# Patient Record
Sex: MALE | Race: OTHER | HISPANIC OR LATINO | URBAN - METROPOLITAN AREA
[De-identification: names, ages, dates, MRNs, and addresses within clinical notes are randomized per-mention and may not be internally consistent; named-entity substitution may affect disease eponyms.]

---

## 2019-12-27 ENCOUNTER — INPATIENT (INPATIENT)
Facility: HOSPITAL | Age: 84
LOS: 13 days | Discharge: NURSING, REHAB OR LONG-TERM CARE | DRG: 312 | End: 2020-01-10
Attending: INTERNAL MEDICINE | Admitting: INTERNAL MEDICINE
Payer: MEDICARE

## 2019-12-27 VITALS
TEMPERATURE: 98 F | OXYGEN SATURATION: 97 % | DIASTOLIC BLOOD PRESSURE: 83 MMHG | SYSTOLIC BLOOD PRESSURE: 187 MMHG | RESPIRATION RATE: 16 BRPM | HEART RATE: 67 BPM

## 2019-12-27 DIAGNOSIS — E87.6 HYPOKALEMIA: ICD-10-CM

## 2019-12-27 DIAGNOSIS — Z29.9 ENCOUNTER FOR PROPHYLACTIC MEASURES, UNSPECIFIED: ICD-10-CM

## 2019-12-27 DIAGNOSIS — I10 ESSENTIAL (PRIMARY) HYPERTENSION: ICD-10-CM

## 2019-12-27 DIAGNOSIS — R29.6 REPEATED FALLS: ICD-10-CM

## 2019-12-27 DIAGNOSIS — R79.89 OTHER SPECIFIED ABNORMAL FINDINGS OF BLOOD CHEMISTRY: ICD-10-CM

## 2019-12-27 DIAGNOSIS — I95.1 ORTHOSTATIC HYPOTENSION: ICD-10-CM

## 2019-12-27 DIAGNOSIS — Z87.438 PERSONAL HISTORY OF OTHER DISEASES OF MALE GENITAL ORGANS: ICD-10-CM

## 2019-12-27 DIAGNOSIS — Z91.89 OTHER SPECIFIED PERSONAL RISK FACTORS, NOT ELSEWHERE CLASSIFIED: ICD-10-CM

## 2019-12-27 DIAGNOSIS — K59.00 CONSTIPATION, UNSPECIFIED: ICD-10-CM

## 2019-12-27 DIAGNOSIS — Z98.890 OTHER SPECIFIED POSTPROCEDURAL STATES: Chronic | ICD-10-CM

## 2019-12-27 LAB
APPEARANCE UR: CLEAR — SIGNIFICANT CHANGE UP
BASOPHILS # BLD AUTO: 0.03 K/UL — SIGNIFICANT CHANGE UP (ref 0–0.2)
BASOPHILS NFR BLD AUTO: 0.6 % — SIGNIFICANT CHANGE UP (ref 0–2)
BILIRUB UR-MCNC: NEGATIVE — SIGNIFICANT CHANGE UP
CK MB CFR SERPL CALC: 4.3 NG/ML — SIGNIFICANT CHANGE UP (ref 0–6.7)
CK MB CFR SERPL CALC: 5 NG/ML — SIGNIFICANT CHANGE UP (ref 0–6.7)
CK MB CFR SERPL CALC: 5.9 NG/ML — SIGNIFICANT CHANGE UP (ref 0–6.7)
CK SERPL-CCNC: 207 U/L — HIGH (ref 30–200)
CK SERPL-CCNC: 234 U/L — HIGH (ref 30–200)
CK SERPL-CCNC: 237 U/L — HIGH (ref 30–200)
COLOR SPEC: YELLOW — SIGNIFICANT CHANGE UP
DIFF PNL FLD: NEGATIVE — SIGNIFICANT CHANGE UP
EOSINOPHIL # BLD AUTO: 0.06 K/UL — SIGNIFICANT CHANGE UP (ref 0–0.5)
EOSINOPHIL NFR BLD AUTO: 1.2 % — SIGNIFICANT CHANGE UP (ref 0–6)
ETHANOL SERPL-MCNC: <10 MG/DL — SIGNIFICANT CHANGE UP (ref 0–10)
GLUCOSE UR QL: NEGATIVE — SIGNIFICANT CHANGE UP
HCT VFR BLD CALC: 41.8 % — SIGNIFICANT CHANGE UP (ref 39–50)
HGB BLD-MCNC: 14.2 G/DL — SIGNIFICANT CHANGE UP (ref 13–17)
IMM GRANULOCYTES NFR BLD AUTO: 0.6 % — SIGNIFICANT CHANGE UP (ref 0–1.5)
KETONES UR-MCNC: NEGATIVE — SIGNIFICANT CHANGE UP
LEUKOCYTE ESTERASE UR-ACNC: NEGATIVE — SIGNIFICANT CHANGE UP
LYMPHOCYTES # BLD AUTO: 0.94 K/UL — LOW (ref 1–3.3)
LYMPHOCYTES # BLD AUTO: 19.5 % — SIGNIFICANT CHANGE UP (ref 13–44)
MCHC RBC-ENTMCNC: 29.8 PG — SIGNIFICANT CHANGE UP (ref 27–34)
MCHC RBC-ENTMCNC: 34 GM/DL — SIGNIFICANT CHANGE UP (ref 32–36)
MCV RBC AUTO: 87.8 FL — SIGNIFICANT CHANGE UP (ref 80–100)
MONOCYTES # BLD AUTO: 0.51 K/UL — SIGNIFICANT CHANGE UP (ref 0–0.9)
MONOCYTES NFR BLD AUTO: 10.6 % — SIGNIFICANT CHANGE UP (ref 2–14)
NEUTROPHILS # BLD AUTO: 3.26 K/UL — SIGNIFICANT CHANGE UP (ref 1.8–7.4)
NEUTROPHILS NFR BLD AUTO: 67.5 % — SIGNIFICANT CHANGE UP (ref 43–77)
NITRITE UR-MCNC: NEGATIVE — SIGNIFICANT CHANGE UP
NRBC # BLD: 0 /100 WBCS — SIGNIFICANT CHANGE UP (ref 0–0)
PH UR: 6 — SIGNIFICANT CHANGE UP (ref 5–8)
PLATELET # BLD AUTO: 215 K/UL — SIGNIFICANT CHANGE UP (ref 150–400)
PROT UR-MCNC: NEGATIVE MG/DL — SIGNIFICANT CHANGE UP
RBC # BLD: 4.76 M/UL — SIGNIFICANT CHANGE UP (ref 4.2–5.8)
RBC # FLD: 15.2 % — HIGH (ref 10.3–14.5)
SP GR SPEC: <=1.005 — SIGNIFICANT CHANGE UP (ref 1–1.03)
TROPONIN T SERPL-MCNC: 0.03 NG/ML — HIGH (ref 0–0.01)
TROPONIN T SERPL-MCNC: 0.03 NG/ML — HIGH (ref 0–0.01)
TROPONIN T SERPL-MCNC: 0.04 NG/ML — CRITICAL HIGH (ref 0–0.01)
UROBILINOGEN FLD QL: 1 E.U./DL — SIGNIFICANT CHANGE UP
WBC # BLD: 4.83 K/UL — SIGNIFICANT CHANGE UP (ref 3.8–10.5)
WBC # FLD AUTO: 4.83 K/UL — SIGNIFICANT CHANGE UP (ref 3.8–10.5)

## 2019-12-27 PROCEDURE — 73562 X-RAY EXAM OF KNEE 3: CPT | Mod: 26,LT,RT

## 2019-12-27 PROCEDURE — 99223 1ST HOSP IP/OBS HIGH 75: CPT | Mod: GC

## 2019-12-27 PROCEDURE — 73523 X-RAY EXAM HIPS BI 5/> VIEWS: CPT | Mod: 26

## 2019-12-27 PROCEDURE — 71045 X-RAY EXAM CHEST 1 VIEW: CPT | Mod: 26

## 2019-12-27 PROCEDURE — 99285 EMERGENCY DEPT VISIT HI MDM: CPT

## 2019-12-27 PROCEDURE — 93010 ELECTROCARDIOGRAM REPORT: CPT

## 2019-12-27 PROCEDURE — 70450 CT HEAD/BRAIN W/O DYE: CPT | Mod: 26

## 2019-12-27 RX ORDER — POTASSIUM CHLORIDE 20 MEQ
40 PACKET (EA) ORAL ONCE
Refills: 0 | Status: COMPLETED | OUTPATIENT
Start: 2019-12-27 | End: 2019-12-27

## 2019-12-27 RX ORDER — TETANUS TOXOID, REDUCED DIPHTHERIA TOXOID AND ACELLULAR PERTUSSIS VACCINE, ADSORBED 5; 2.5; 8; 8; 2.5 [IU]/.5ML; [IU]/.5ML; UG/.5ML; UG/.5ML; UG/.5ML
0.5 SUSPENSION INTRAMUSCULAR ONCE
Refills: 0 | Status: COMPLETED | OUTPATIENT
Start: 2019-12-27 | End: 2019-12-27

## 2019-12-27 RX ORDER — ENOXAPARIN SODIUM 100 MG/ML
40 INJECTION SUBCUTANEOUS EVERY 24 HOURS
Refills: 0 | Status: DISCONTINUED | OUTPATIENT
Start: 2019-12-27 | End: 2020-01-07

## 2019-12-27 RX ORDER — ASPIRIN/CALCIUM CARB/MAGNESIUM 324 MG
1 TABLET ORAL
Qty: 0 | Refills: 0 | DISCHARGE

## 2019-12-27 RX ORDER — POLYETHYLENE GLYCOL 3350 17 G/17G
17 POWDER, FOR SOLUTION ORAL EVERY 24 HOURS
Refills: 0 | Status: DISCONTINUED | OUTPATIENT
Start: 2019-12-27 | End: 2019-12-29

## 2019-12-27 RX ORDER — ASPIRIN/CALCIUM CARB/MAGNESIUM 324 MG
81 TABLET ORAL EVERY 24 HOURS
Refills: 0 | Status: DISCONTINUED | OUTPATIENT
Start: 2019-12-27 | End: 2020-01-10

## 2019-12-27 RX ORDER — SODIUM CHLORIDE 9 MG/ML
1000 INJECTION INTRAMUSCULAR; INTRAVENOUS; SUBCUTANEOUS
Refills: 0 | Status: DISCONTINUED | OUTPATIENT
Start: 2019-12-27 | End: 2019-12-27

## 2019-12-27 RX ORDER — TAMSULOSIN HYDROCHLORIDE 0.4 MG/1
0.4 CAPSULE ORAL AT BEDTIME
Refills: 0 | Status: DISCONTINUED | OUTPATIENT
Start: 2019-12-27 | End: 2020-01-10

## 2019-12-27 RX ORDER — AMLODIPINE BESYLATE 2.5 MG/1
5 TABLET ORAL EVERY 24 HOURS
Refills: 0 | Status: DISCONTINUED | OUTPATIENT
Start: 2019-12-27 | End: 2019-12-28

## 2019-12-27 RX ORDER — SENNA PLUS 8.6 MG/1
2 TABLET ORAL AT BEDTIME
Refills: 0 | Status: DISCONTINUED | OUTPATIENT
Start: 2019-12-27 | End: 2020-01-10

## 2019-12-27 RX ADMIN — POLYETHYLENE GLYCOL 3350 17 GRAM(S): 17 POWDER, FOR SOLUTION ORAL at 23:42

## 2019-12-27 RX ADMIN — AMLODIPINE BESYLATE 5 MILLIGRAM(S): 2.5 TABLET ORAL at 23:37

## 2019-12-27 RX ADMIN — ENOXAPARIN SODIUM 40 MILLIGRAM(S): 100 INJECTION SUBCUTANEOUS at 23:37

## 2019-12-27 RX ADMIN — TAMSULOSIN HYDROCHLORIDE 0.4 MILLIGRAM(S): 0.4 CAPSULE ORAL at 23:37

## 2019-12-27 RX ADMIN — Medication 40 MILLIEQUIVALENT(S): at 19:22

## 2019-12-27 RX ADMIN — SODIUM CHLORIDE 125 MILLILITER(S): 9 INJECTION INTRAMUSCULAR; INTRAVENOUS; SUBCUTANEOUS at 13:52

## 2019-12-27 RX ADMIN — Medication 81 MILLIGRAM(S): at 23:37

## 2019-12-27 RX ADMIN — SENNA PLUS 2 TABLET(S): 8.6 TABLET ORAL at 23:37

## 2019-12-27 RX ADMIN — Medication 40 MILLIEQUIVALENT(S): at 16:57

## 2019-12-27 RX ADMIN — TETANUS TOXOID, REDUCED DIPHTHERIA TOXOID AND ACELLULAR PERTUSSIS VACCINE, ADSORBED 0.5 MILLILITER(S): 5; 2.5; 8; 8; 2.5 SUSPENSION INTRAMUSCULAR at 13:52

## 2019-12-27 NOTE — H&P ADULT - ASSESSMENT
Patient is a 88 year old man with past medical history of HTN, and BPH admitted after mechanical fall this AM (12/27) with increased frequency of falls at home (syncopal and mechanical) PT recommending MONIKA. EKG w/o ischemic changes however with mildly elevated troponin (w/o symptoms); trending to clearance.

## 2019-12-27 NOTE — H&P ADULT - NSHPPHYSICALEXAM_GEN_ALL_CORE
VITAL SIGNS:  T(F): 98.6 (12-27-19 @ 19:00), Max: 99.1 (12-27-19 @ 15:58)  HR: 66 (12-27-19 @ 19:00) (66 - 72)  BP: 163/78 (12-27-19 @ 19:00) (163/78 - 187/83)  BP(mean): --  RR: 18 (12-27-19 @ 19:00) (16 - 18)  SpO2: 96% (12-27-19 @ 19:00) (96% - 97%)    PHYSICAL EXAM:  Constitutional: Elderly AA man, WDWN resting comfortably in bed; NAD  HEENT: Anicteric sclera, no nasal discharge; uvula midline, no oropharyngeal erythema or exudates; slightly dry mucous membranes  Neck: supple  Respiratory: CTA B/L; no W/R/R, no retractions  Cardiac: +S1/S2; RRR; no M/R/G  Gastrointestinal: abdomen distended, BS x 4, non-tender   Extremities: WWP, no clubbing or cyanosis; trace ankle edema  Vascular: 2+ radial, 1+ DP/PT pulses B/L  Dermatologic: skin warm, dry and intact  Lymphatic: no submandibular or cervical LAD  Neurologic: AAOx3; CNII-XII grossly intact; 4/5 strength proximally with flexion of lower extremities   Psychiatric: affect and characteristics of appearance, verbalizations, behaviors are appropriate

## 2019-12-27 NOTE — H&P ADULT - PROBLEM SELECTOR PLAN 6
Patient with constipation for 5-6 days distended on exam  - senna 2 tabs   - miralax daily Patient with hypertension non-compliant with amlodipine and atenolol  - restarting home amlodipine 5mg

## 2019-12-27 NOTE — H&P ADULT - PROBLEM SELECTOR PLAN 7
DVT prophylaxis= Lovenox  F no fluids  E K>4 Mg>2  N DASH diet Patient with orthostatic hypotension noted in ED s/p 500cc of fluid in ED   - repeat orthostatics in AM; may be component of frequent falls Patient with +orthostatics  noted in ED (180 systolic -> 160 systolic) s/p 500cc of fluid in ED   - repeat orthostatics in AM; may be component of frequent falls Patient with history of BPH w/ multiple UTIs  - c/w flomax .4mg

## 2019-12-27 NOTE — H&P ADULT - NSHPLABSRESULTS_GEN_ALL_CORE
LABS:                         14.2   4.83  )-----------( 215      ( 27 Dec 2019 13:48 )             41.8         144  |  100  |  16  ----------------------------<  102<H>  3.2<L>   |  30  |  1.18    Ca    8.7      27 Dec 2019 13:48    TPro  5.9<L>  /  Alb  3.5  /  TBili  0.8  /  DBili  x   /  AST  24  /  ALT  25  /  AlkPhos  82        Urinalysis Basic - ( 27 Dec 2019 13:01 )    Color: Yellow / Appearance: Clear / SG: <=1.005 / pH: x  Gluc: x / Ketone: NEGATIVE  / Bili: Negative / Urobili: 1.0 E.U./dL   Blood: x / Protein: NEGATIVE mg/dL / Nitrite: NEGATIVE   Leuk Esterase: NEGATIVE / RBC: x / WBC x   Sq Epi: x / Non Sq Epi: x / Bacteria: x      CARDIAC MARKERS ( 27 Dec 2019 20:14 )  x     / 0.04 ng/mL / 234 U/L / x     / 5.0 ng/mL  CARDIAC MARKERS ( 27 Dec 2019 13:48 )  x     / 0.03 ng/mL / 237 U/L / x     / 5.9 ng/mL            RADIOLOGY, EKG & ADDITIONAL TESTS: Reviewed.     EKst degree heart block no ischemic changes  CT head: w/o acute intracranial pathology.

## 2019-12-27 NOTE — ED ADULT TRIAGE NOTE - OTHER COMPLAINTS
pt c.o b/l knee pain s/p fall this morning. per ems, weeks of unsteady gait with noncompliance with prescribed medications. denies head trauma.

## 2019-12-27 NOTE — ED PROVIDER NOTE - CLINICAL SUMMARY MEDICAL DECISION MAKING FREE TEXT BOX
mult falls in the past months 4-5 lives alone needs walker knees buckle  right knee contusion seen by PT rec admit ? MONIKA

## 2019-12-27 NOTE — ED PROVIDER NOTE - OBJECTIVE STATEMENT
87 yo male with hx HTN lives alone mult falls over the past 1-2 months > 6-- fell again this am-  no head trauma  feels unsteady on feet  no prior hx of CVA TIA  noted abrasions to bilat knees   no DM  no hx of MI or known CAD ? last tet?

## 2019-12-27 NOTE — PHYSICAL THERAPY INITIAL EVALUATION ADULT - ADDITIONAL COMMENTS
Pt lives in elevator building without stairs, denies using DME, reports hx of multiple falls in past year. However, pt states he is fully functionally independent having gone grocery shopping just 2 days ago.

## 2019-12-27 NOTE — ED PROVIDER NOTE - CARE PLAN
Principal Discharge DX:	Falls frequently  Secondary Diagnosis:	Knee contusion  Secondary Diagnosis:	Hypertension, unspecified type

## 2019-12-27 NOTE — H&P ADULT - PROBLEM SELECTOR PLAN 3
Patient with history of BPH w/ multiple UTIs  - c/w flomax .4mg Patient with elevated troponin .03 -> trended to .04. EKG x 2 w/o ischemic changes.  - repeat troponin ordered for 10pm; currently w/o symptoms

## 2019-12-27 NOTE — H&P ADULT - PROBLEM SELECTOR PLAN 4
Patient with elevated troponin .03 -> trended to .04. EKG x 2 w/o ischemic changes.  - repeat troponin ordered for 10pm; currently w/o symptoms Patient with hypokalemia; K 3.2   - repleted 40meq x 2; repeat BMP in the AM

## 2019-12-27 NOTE — H&P ADULT - PROBLEM SELECTOR PLAN 5
Patient with hypokalemia; K 3.2   - repleted 40meq x 2; repeat BMP in the AM Patient with constipation for 5-6 days distended on exam  - senna 2 tabs   - miralax daily

## 2019-12-27 NOTE — H&P ADULT - ATTENDING COMMENTS
patient seen and examined a/f weakness, frequent falls  reviewed pertinent data, h&p   PE  findings as above, decreased ROM b/l at attempt to flex knees b/l     a/p: weakness in setting of frequent falls. seen by PT, pending MONIKA placement; follow up repeat orthostatics, may be element of dehydration vs autonomic dysfunction. if persisting will need further workup     rest of plan as above patient seen and examined a/f weakness, frequent falls  reviewed pertinent data, h&p   PE  findings as above, decreased ROM b/l at attempt to flex knees b/l ; 3/5 motor at lower ext b/l     a/p: weakness in setting of frequent falls. seen by PT, pending MONIKA placement; follow up repeat orthostatics, may be element of dehydration vs autonomic dysfunction. if persisting will need further workup     rest of plan as above

## 2019-12-27 NOTE — PHYSICAL THERAPY INITIAL EVALUATION ADULT - CRITERIA FOR SKILLED THERAPEUTIC INTERVENTIONS
impairments found/anticipated discharge recommendation/functional limitations in following categories/rehab potential/therapy frequency/risk reduction/prevention

## 2019-12-27 NOTE — H&P ADULT - PROBLEM SELECTOR PLAN 8
1) PCP Contacted on Admission: (Y/N) --> Name & Phone #: will need follow up with Dr. Rosado on discharge; contact in AM   2) Date of Contact with PCP:  3) PCP Contacted at Discharge: (Y/N)  4) Summary of Handoff Given to PCP:   5) Post-Discharge Appointment Date and Location: DVT prophylaxis= Lovenox  F no fluids  E K>4 Mg>2  N DASH diet

## 2019-12-27 NOTE — H&P ADULT - NSHPREVIEWOFSYSTEMS_GEN_ALL_CORE
REVIEW OF SYSTEMS:  CONSTITUTIONAL: Patient denies weakness, fevers or chills  EYES/ENT: Patient denies visual changes;  denies vertigo or throat pain   NECK: Patient denies pain or stiffness  RESPIRATORY: Patient denies cough, wheezing, hemoptysis; denies shortness of breath  CARDIOVASCULAR: Patient denies chest pain or palpitations  GASTROINTESTINAL: Patient denies abdominal or epigastric pain, nausea, vomiting, or hematemesis, diarrhea or constipation, melena or hematochezia.  GENITOURINARY: Patient denies dysuria, frequency or hematuria  NEUROLOGICAL: Patient denies numbness or weakness  SKIN: Patient denies itching, burning, rashes, or lesions   All other review of systems is negative unless indicated above. REVIEW OF SYSTEMS:  CONSTITUTIONAL: Patient denies fevers or chills  EYES/ENT: Patient denies visual changes;  denies vertigo or throat pain   NECK: Patient denies pain or stiffness  RESPIRATORY: Patient denies cough, wheezing, denies shortness of breath  CARDIOVASCULAR: Patient denies chest pain or palpitations  GASTROINTESTINAL: Patient denies abdominal or epigastric pain, nausea, vomiting, diarrhea or constipation, melena or hematochezia.  GENITOURINARY: Patient denies dysuria, frequency or hematuria  NEUROLOGICAL: Patient denies numbness or weakness  SKIN: Patient denies itching, burning, rashes, or lesions   All other review of systems is negative unless indicated above.

## 2019-12-27 NOTE — ED ADULT NURSE NOTE - OBJECTIVE STATEMENT
Patient presents complaining of recently having more falls.  Patient today fell while getting his newspaper striking both knees.  Patient is able to stand and bend both knees however admits pain is illicited to his anterior hips. Patient denies striking head.  He admits to stopping all of his medications because he has not felt like it.

## 2019-12-27 NOTE — H&P ADULT - PROBLEM SELECTOR PLAN 1
Patient with repeated falls as an outpatient; fall this AM mechanical in nature. However reports a fall 4 days ago is unsure of events. Last fall prior to that 2 months ago also unsure of events. Patient reports intermittent syncopal and mechanical falls. Differential includes orthostatic hypotension vs mechanical falls due to OA vs mechanical fall due to alcohol misuse (denies prior hospitalization for withdrawal) vs arrhythmia (less likely 1st degree heart block on EKG x 2).    - collateral from [place] (has received care in the past)  - w/o murmur no indication for echo   - orthostatics positive on admission will encourage PO intake; s/p 500cc of fluid in ED and repeat orthostatics in AM; currently hypertensive on vitals with only slight mucous membranes on exam  - EKG= 1st degree block   - PT eval = MONIKA Patient with repeated falls as an outpatient; fall this AM mechanical in nature. However reports a fall 4 days ago is unsure of events. Last fall prior to that 2 months ago also unsure of events. Patient reports intermittent syncopal and mechanical falls. Differential includes orthostatic hypotension vs mechanical falls due to OA vs mechanical fall due to alcohol misuse (denies prior hospitalization for withdrawal) vs arrhythmia (less likely 1st degree heart block on EKG x 2).    - collateral from [place] (has received care in the past)  - w/o murmur no indication for echo   - orthostatics positive on admission will encourage PO intake; s/p 500cc of fluid in ED and repeat orthostatics in AM; currently hypertensive on vitals with only slightly dry mucous membranes on exam  - EKG= 1st degree block   - PT eval = MONIKA

## 2019-12-27 NOTE — ED ADULT NURSE NOTE - NSIMPLEMENTINTERV_GEN_ALL_ED
Implemented All Fall with Harm Risk Interventions:  [place] to call system. Call bell, personal items and telephone within reach. Instruct patient to call for assistance. Room bathroom lighting operational. Non-slip footwear when patient is off stretcher. Physically safe environment: no spills, clutter or unnecessary equipment. Stretcher in lowest position, wheels locked, appropriate side rails in place. Provide visual cue, wrist band, yellow gown, etc. Monitor gait and stability. Monitor for mental status changes and reorient to person, place, and time. Review medications for side effects contributing to fall risk. Reinforce activity limits and safety measures with patient and family. Provide visual clues: red socks.

## 2019-12-27 NOTE — H&P ADULT - HISTORY OF PRESENT ILLNESS
Patient is a 88 year old man with past medical history of HTN, OA, multiple UTI's 2/2 to BPH who presents for fall this AM. Patient reports this AM he was reaching for the newspaper when his knees buckled. He called his neighbor for help. The neighbor helped him up and EMS was called. Patient denied any head trauma with fall. Patient denies any LOC or pre-syncopal symptoms. Patient states last fall prior to that was 4 days ago cannot remember the events surrounding that fall whether it was mechanical or syncopal. Patient states he has had multiple falls over the past couple of years, some syncopal some mechanical like this mornings fall.   PMHx: HTN, OA, UTI 2/2 to BPH   Allergies: none   PSHx: 15 yrs ago right knee surgery   FHx: parents  of old age, unclear family hx patient w/o siblings or children  SHx: never smoker, drinks 1-2 shots of whisky a day, no drug use. 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 Does not use assistive devices to walk.   ED Course: T 98.2, HR 67, /83, S 97% on RA. Patient labs significant for hypokalemia to 3.2. Troponin .03 with  w/o chest pain. UA negative. CT head negative for intracranial bleed. EKG sinus with 1st degree AV block otherwise w/o ischemic changes. PT eval MONIKA. Patient admitted for MONIKA placement.

## 2019-12-27 NOTE — H&P ADULT - PROBLEM SELECTOR PLAN 2
Patient with hypertension non-compliant with amlodipine and atenolol  - restarting home amlodipine 5mg Patient with +orthostatics  noted in ED (180 systolic -> 160 systolic) s/p 500cc of fluid in ED   - repeat orthostatics in AM; may be component of frequent falls

## 2019-12-28 DIAGNOSIS — R60.9 EDEMA, UNSPECIFIED: ICD-10-CM

## 2019-12-28 LAB
ALBUMIN SERPL ELPH-MCNC: 3.3 G/DL — SIGNIFICANT CHANGE UP (ref 3.3–5)
ALP SERPL-CCNC: 75 U/L — SIGNIFICANT CHANGE UP (ref 40–120)
ALT FLD-CCNC: 21 U/L — SIGNIFICANT CHANGE UP (ref 10–45)
ANION GAP SERPL CALC-SCNC: 14 MMOL/L — SIGNIFICANT CHANGE UP (ref 5–17)
AST SERPL-CCNC: 22 U/L — SIGNIFICANT CHANGE UP (ref 10–40)
BASOPHILS # BLD AUTO: 0.02 K/UL — SIGNIFICANT CHANGE UP (ref 0–0.2)
BASOPHILS NFR BLD AUTO: 0.4 % — SIGNIFICANT CHANGE UP (ref 0–2)
BILIRUB SERPL-MCNC: 0.9 MG/DL — SIGNIFICANT CHANGE UP (ref 0.2–1.2)
BUN SERPL-MCNC: 15 MG/DL — SIGNIFICANT CHANGE UP (ref 7–23)
CALCIUM SERPL-MCNC: 8.4 MG/DL — SIGNIFICANT CHANGE UP (ref 8.4–10.5)
CHLORIDE SERPL-SCNC: 103 MMOL/L — SIGNIFICANT CHANGE UP (ref 96–108)
CO2 SERPL-SCNC: 26 MMOL/L — SIGNIFICANT CHANGE UP (ref 22–31)
CREAT SERPL-MCNC: 1.19 MG/DL — SIGNIFICANT CHANGE UP (ref 0.5–1.3)
CULTURE RESULTS: SIGNIFICANT CHANGE UP
EOSINOPHIL # BLD AUTO: 0.12 K/UL — SIGNIFICANT CHANGE UP (ref 0–0.5)
EOSINOPHIL NFR BLD AUTO: 2.2 % — SIGNIFICANT CHANGE UP (ref 0–6)
GLUCOSE SERPL-MCNC: 102 MG/DL — HIGH (ref 70–99)
HCT VFR BLD CALC: 42.3 % — SIGNIFICANT CHANGE UP (ref 39–50)
HGB BLD-MCNC: 13.8 G/DL — SIGNIFICANT CHANGE UP (ref 13–17)
IMM GRANULOCYTES NFR BLD AUTO: 0.6 % — SIGNIFICANT CHANGE UP (ref 0–1.5)
LYMPHOCYTES # BLD AUTO: 1.41 K/UL — SIGNIFICANT CHANGE UP (ref 1–3.3)
LYMPHOCYTES # BLD AUTO: 26.4 % — SIGNIFICANT CHANGE UP (ref 13–44)
MAGNESIUM SERPL-MCNC: 1.9 MG/DL — SIGNIFICANT CHANGE UP (ref 1.6–2.6)
MCHC RBC-ENTMCNC: 29.4 PG — SIGNIFICANT CHANGE UP (ref 27–34)
MCHC RBC-ENTMCNC: 32.6 GM/DL — SIGNIFICANT CHANGE UP (ref 32–36)
MCV RBC AUTO: 90 FL — SIGNIFICANT CHANGE UP (ref 80–100)
MONOCYTES # BLD AUTO: 0.53 K/UL — SIGNIFICANT CHANGE UP (ref 0–0.9)
MONOCYTES NFR BLD AUTO: 9.9 % — SIGNIFICANT CHANGE UP (ref 2–14)
NEUTROPHILS # BLD AUTO: 3.23 K/UL — SIGNIFICANT CHANGE UP (ref 1.8–7.4)
NEUTROPHILS NFR BLD AUTO: 60.5 % — SIGNIFICANT CHANGE UP (ref 43–77)
NRBC # BLD: 0 /100 WBCS — SIGNIFICANT CHANGE UP (ref 0–0)
PLATELET # BLD AUTO: 219 K/UL — SIGNIFICANT CHANGE UP (ref 150–400)
POTASSIUM SERPL-MCNC: 4 MMOL/L — SIGNIFICANT CHANGE UP (ref 3.5–5.3)
POTASSIUM SERPL-SCNC: 4 MMOL/L — SIGNIFICANT CHANGE UP (ref 3.5–5.3)
PROT SERPL-MCNC: 5.6 G/DL — LOW (ref 6–8.3)
RBC # BLD: 4.7 M/UL — SIGNIFICANT CHANGE UP (ref 4.2–5.8)
RBC # FLD: 15.4 % — HIGH (ref 10.3–14.5)
SODIUM SERPL-SCNC: 143 MMOL/L — SIGNIFICANT CHANGE UP (ref 135–145)
SPECIMEN SOURCE: SIGNIFICANT CHANGE UP
WBC # BLD: 5.34 K/UL — SIGNIFICANT CHANGE UP (ref 3.8–10.5)
WBC # FLD AUTO: 5.34 K/UL — SIGNIFICANT CHANGE UP (ref 3.8–10.5)

## 2019-12-28 PROCEDURE — 73610 X-RAY EXAM OF ANKLE: CPT | Mod: 26,LT,RT

## 2019-12-28 PROCEDURE — 93010 ELECTROCARDIOGRAM REPORT: CPT

## 2019-12-28 PROCEDURE — 99233 SBSQ HOSP IP/OBS HIGH 50: CPT | Mod: GC

## 2019-12-28 RX ORDER — ACETAMINOPHEN 500 MG
650 TABLET ORAL EVERY 6 HOURS
Refills: 0 | Status: DISCONTINUED | OUTPATIENT
Start: 2019-12-28 | End: 2020-01-10

## 2019-12-28 RX ORDER — MAGNESIUM SULFATE 500 MG/ML
1 VIAL (ML) INJECTION ONCE
Refills: 0 | Status: COMPLETED | OUTPATIENT
Start: 2019-12-28 | End: 2019-12-28

## 2019-12-28 RX ORDER — AMLODIPINE BESYLATE 2.5 MG/1
10 TABLET ORAL DAILY
Refills: 0 | Status: DISCONTINUED | OUTPATIENT
Start: 2019-12-28 | End: 2020-01-10

## 2019-12-28 RX ORDER — INFLUENZA VIRUS VACCINE 15; 15; 15; 15 UG/.5ML; UG/.5ML; UG/.5ML; UG/.5ML
0.5 SUSPENSION INTRAMUSCULAR ONCE
Refills: 0 | Status: DISCONTINUED | OUTPATIENT
Start: 2019-12-28 | End: 2019-12-28

## 2019-12-28 RX ORDER — INFLUENZA VIRUS VACCINE 15; 15; 15; 15 UG/.5ML; UG/.5ML; UG/.5ML; UG/.5ML
0.5 SUSPENSION INTRAMUSCULAR ONCE
Refills: 0 | Status: DISCONTINUED | OUTPATIENT
Start: 2019-12-28 | End: 2020-01-10

## 2019-12-28 RX ADMIN — TAMSULOSIN HYDROCHLORIDE 0.4 MILLIGRAM(S): 0.4 CAPSULE ORAL at 22:12

## 2019-12-28 RX ADMIN — Medication 650 MILLIGRAM(S): at 12:11

## 2019-12-28 RX ADMIN — Medication 100 GRAM(S): at 18:51

## 2019-12-28 RX ADMIN — Medication 650 MILLIGRAM(S): at 13:13

## 2019-12-28 RX ADMIN — AMLODIPINE BESYLATE 10 MILLIGRAM(S): 2.5 TABLET ORAL at 14:14

## 2019-12-28 RX ADMIN — Medication 81 MILLIGRAM(S): at 22:12

## 2019-12-28 RX ADMIN — SENNA PLUS 2 TABLET(S): 8.6 TABLET ORAL at 22:12

## 2019-12-28 NOTE — PROGRESS NOTE ADULT - PROBLEM SELECTOR PLAN 1
Patient with repeated falls as an outpatient, fall on this admission appears mechanical in nature.   Differential includes orthostatic hypotension vs mechanical falls due to OA vs mechanical fall due to alcohol misuse (denies prior hospitalization for withdrawal) vs arrhythmia (less likely 1st degree heart block on EKG x 2).    - collateral from [place] (has received care in the past)  - w/o murmur no indication for echo   - orthostatics positive on admission will encourage PO intake; s/p 500cc of fluid in ED and repeat orthostatics in AM; currently hypertensive on vitals with only slightly dry mucous membranes on exam  - EKG= 1st degree block   -Will have PT reeval in AM

## 2019-12-28 NOTE — PROGRESS NOTE ADULT - PROBLEM SELECTOR PLAN 10
Lower extremity, L > R, check doppler Lower extremity, L > R, check doppler    ##CKD  Likely baseline  Will consider switching Norvasc to ACE-i

## 2019-12-28 NOTE — PROGRESS NOTE ADULT - PROBLEM SELECTOR PLAN 2
Patient with +orthostatics  noted in ED (180 systolic -> 160 systolic) s/p 500cc of fluid in ED   - repeat orthostatics in AM were negative

## 2019-12-28 NOTE — PROGRESS NOTE ADULT - ASSESSMENT
Patient is a 88 year old man with past medical history of HTN, and BPH admitted after mechanical fall this AM (12/27) with increased frequency of falls at home (syncopal and mechanical) PT recommending MONIAK. EKG w/o ischemic changes however with mildly elevated troponin (w/o symptoms); trending to clearance.

## 2019-12-28 NOTE — PROGRESS NOTE ADULT - PROBLEM SELECTOR PLAN 3
Patient with elevated troponin .03 -> trended to .04. EKG x 2 w/o ischemic changes.  -troponin peaked at .04

## 2019-12-28 NOTE — PATIENT PROFILE ADULT - NSASFALLNEEDSASSIST_GEN_A_NUR
[FreeTextEntry1] : \par Nausea: intermittent--resolved\par Assoc malaise, anorexia--better\par Regurg/ dyspepsia--better\par Hepatitis: TB is trending down, transaminases are normal\par 6 MP toxicity--d/c on 2/28\par Hep Serologies are neg; amylase/lipase--wnl\par some  GERD\par \par \par P: anti-reflux, lactose free, low fat\par    Pepcid 40 bid--> bid  alt w 1 qd --> prn \par    No NDSAIDs/ETOH\par    Hold 6MP--> D/C \par \par \par \par \par \par 1. 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  : stable , No cramps, mucous/blood pr \par 10/5/17 w recent flare probably food borne, ESR=25, CRP=52--> 11/28/17: ESR=21, CRP=15-->12/21/17: ESR=22,CRP=39-->2/9/18: ESR=11, CRP<5 ->3/23/18: ESR=11,CRP=7-->\par 5/4/18: ESR=28, CRP=17-->7/31/18: ESR=38, CRP=9.7-->9/14/18: ESR=15, CRP<5-->10/12/18: ESR=11, CRP=6.2-->\par 11/9/18: ESR=13,  CRP=6--> 11/30/18: ESR=20, CRP=2.7--> 1/4/19: ESR=16, CRP=0.4-->2/8/19: ESR=20, CRP=0.7-->3/8/19: CRP=0.42--> 3/18/19: CRP=.14, ESR=36-->3/22/19: CRP=0.21, ESR=28--> 4/22/19: CRP=0.6, ESR=46--> 6/7/19: CRP=0.73, ESR=35-->7/9/19: CRP=.19, ESR=23-->7/23/19: CRP=0.11, ESR=10-->8/8/19: ESR=29-->9/10/19:ESR=18, CRP=0.27-->10/4/19:ESR=49, CRP=3.4 --? flare vs ? bug,will recheck today\par Mild-Moderate Pancolitis-10/2010\par 3/2013: Sigmoid Inflammatory Polyps\par 8/13/18 Colonoscopy: mild-mod proctosigmoiditis--patchy\par 6 MP 50 mg qd: 8/23/18--> 75mg qd on 9/14/18--> 75mg alt w 100mg on 9/28/18-->100mg on 10/12/18-->100mg alt w 125mg on 11/11/18--> 125mg qd on 11/30 /18-->125mg alt w 150 on 1/4/19--> 150mg--> d/c\par Will need Humira, once LFTs normalized but  Inflamm markers up:Load w 160, 80, 40 qow \par 160mg on 7/9/19, 80mg on 7/23/19, 40mg qow, tool today 10/4/19\par Discussed the risk and benefits including infections, lymphoma\par Uceris 9mg qd tapered off--5/2015, restarted 9mg qd 12/25/17--> 3/23/18: 1 qod-->6/11/19: 1 qd-->9/27/19 1 qod-->10/4/19: take 1 skip 2D--will checl labs today\par re check esr/crp if still elev will Check ADA and Ab levels at trough \par Lialda 4 qd--> Apriso 3 grm 2/2 Ins--~2/2/18\par Flax seed Oil 3, Probiotic 3\par check labs--cbc,esr,crp,\par 5/4/18: TPMT =26.6\par Colonoscopy--surveillance: 8/2020.   \par Flu shot L delt 10/4/19\par \par \par \par \par 2. Diverticulosis of large intestine w/o  hemorrhage  :well controlled, no pain, infection,bleeding\par Discussed the potential complications of perforation, pain, infection, bleeding.\par Moderate -Fiber Diet was reviewed and emphasized.\par 6 - 8 cups of decaffeinated fluid daily.   \par \par \par \par \par 3. Irritable bowel syndrome with diarrhea : stable, no pain , no diarrhea, no bloat\par Moderate-Fiber diet, Low fat & lactose-free, low FODMAPS,\par increase fluid intake, reg exercise,\par Probiotics 3 daily \par \par \par \par \par 4. Gastroesophageal reflux disease w/o  esophagitis  :better w less  ht burn, No dysphagia, throat clear\par * No LPR, No Barretts w No Dysplasia , No Esophagitis grade: A \par *Anti-reflux diet and life-style changes emphasized. No Bedge. \par *Weight Reduction & reg exercise emphasized. \par * ++ PPI: Prilosec 20mg prn--> 40mg bid--> 40mg qam -->prn   , No H2B Q HS: , No Carafate 1gm: \par * No F/U EGD: (_ )mo. / (_ )yrs, \par * No pH Monitor, No Manometry, No esophagram \par * No ENT eval., No Surgical eval.   \par \par \par \par \par 5. Anemia due to chronic blood loss  & B12 Deficiency\par  stable, last Hgb in 7/19=15 , ZYC=414\par B12<150\par MVI, FA 1qd, Slo-Fe 1 qd\par check cbc, MHW=659 Homocysteine=13, IF AB & Parietal cell Abs both neg\par supplement B12--> sc and then po \par B12: R delt: #1 9/10/19: 1cc,  #2- 9/20/19: 1cc, #3- 9/27/19: 1cc, #4-10/4/19: 1cc R, #5-10/10: 1cc\par \par \par \par \par \par 6. Internal hemorrhoids  :well - controlled, no pain, swelling, itch, bleeding \par *Discussed the potential complications of thrombosis, pain, bleeding, swelling, itching, infection. \par *High-Fiber Diet was reviewed and emphasized. \par *6 -- 8 cups of decaffeinated fluid daily \par * Sitz Bathes BID, No: Anusol HC Suppos/Cream NV BID, \par * No: Tucks BID, No Balneol Lotion, No: Calmoseptine Oint, +++ Prep H prn \par * No: Colorectal Surgical evaluation for possible ablation.   \par \par \par \par  yes

## 2019-12-29 ENCOUNTER — TRANSCRIPTION ENCOUNTER (OUTPATIENT)
Age: 84
End: 2019-12-29

## 2019-12-29 PROCEDURE — 93970 EXTREMITY STUDY: CPT | Mod: 26

## 2019-12-29 PROCEDURE — 99233 SBSQ HOSP IP/OBS HIGH 50: CPT | Mod: GC

## 2019-12-29 RX ORDER — POLYETHYLENE GLYCOL 3350 17 G/17G
17 POWDER, FOR SOLUTION ORAL EVERY 12 HOURS
Refills: 0 | Status: DISCONTINUED | OUTPATIENT
Start: 2019-12-29 | End: 2020-01-10

## 2019-12-29 RX ADMIN — Medication 1 ENEMA: at 15:52

## 2019-12-29 RX ADMIN — SENNA PLUS 2 TABLET(S): 8.6 TABLET ORAL at 21:12

## 2019-12-29 RX ADMIN — TAMSULOSIN HYDROCHLORIDE 0.4 MILLIGRAM(S): 0.4 CAPSULE ORAL at 21:12

## 2019-12-29 RX ADMIN — ENOXAPARIN SODIUM 40 MILLIGRAM(S): 100 INJECTION SUBCUTANEOUS at 23:00

## 2019-12-29 RX ADMIN — ENOXAPARIN SODIUM 40 MILLIGRAM(S): 100 INJECTION SUBCUTANEOUS at 00:00

## 2019-12-29 RX ADMIN — Medication 650 MILLIGRAM(S): at 07:22

## 2019-12-29 RX ADMIN — POLYETHYLENE GLYCOL 3350 17 GRAM(S): 17 POWDER, FOR SOLUTION ORAL at 00:00

## 2019-12-29 RX ADMIN — Medication 650 MILLIGRAM(S): at 06:22

## 2019-12-29 RX ADMIN — POLYETHYLENE GLYCOL 3350 17 GRAM(S): 17 POWDER, FOR SOLUTION ORAL at 17:51

## 2019-12-29 RX ADMIN — POLYETHYLENE GLYCOL 3350 17 GRAM(S): 17 POWDER, FOR SOLUTION ORAL at 06:22

## 2019-12-29 RX ADMIN — AMLODIPINE BESYLATE 10 MILLIGRAM(S): 2.5 TABLET ORAL at 06:21

## 2019-12-29 RX ADMIN — Medication 81 MILLIGRAM(S): at 21:12

## 2019-12-29 NOTE — DISCHARGE NOTE PROVIDER - NSDCFUADDAPPT_GEN_ALL_CORE_FT
Please follow up with your primary care doctor within 1-2 weeks of discharge. Please follow up with your primary care doctor within 1-2 weeks of discharge.  Please follow up with Dr. Gonzalez within 1-2 weeks of discharge. He is a heart doctor who can continue to work up your irregular heart rate. This is important and you need to follow up with a heart doctor soon. Please follow up with your primary care doctor within 1-2 weeks of discharge.  PATIENT HAS APPOINTMENT DR PATRICE CHUA ON 1/15/2020 AT 230PM  [address] [address]

## 2019-12-29 NOTE — PROGRESS NOTE ADULT - PROBLEM SELECTOR PLAN 1
Patient with repeated falls as an outpatient, fall on this admission appears mechanical in nature.   Differential includes orthostatic hypotension vs mechanical falls due to OA vs mechanical fall due to alcohol misuse (denies prior hospitalization for withdrawal) vs arrhythmia (less likely 1st degree heart block on EKG x 2).    - collateral from [place] (has received care in the past)  - w/o murmur no indication for echo   - orthostatics positive on admission negative on repeat  - EKG= 1st degree block   -PT now rec MONIKA

## 2019-12-29 NOTE — DISCHARGE NOTE PROVIDER - NSDCCPCAREPLAN_GEN_ALL_CORE_FT
PRINCIPAL DISCHARGE DIAGNOSIS  Diagnosis: Falls frequently  Assessment and Plan of Treatment: You have had several falls in the past. In the hospital, we noticed you were dehydrated based on assessing your blood pressure when lying and standing. There were not broken or dislocated bones in your hip, knee, and ankle      SECONDARY DISCHARGE DIAGNOSES  Diagnosis: Hypertension, unspecified type  Assessment and Plan of Treatment: We increased your norvasc from 5 to 20 mg  Hypertension is the medical term for high blood pressure. Blood pressure refers to the pressure that blood applies to the inner walls of the arteries. Arteries carry blood from the heart to other organs and parts of the body. Untreated high blood pressure increases the strain on the heart and arteries, eventually causing organ damage. High blood pressure increases the risk of heart failure, heart attack (myocardial infarction), stroke, and kidney failure. High blood pressure does not usually cause any symptoms. Treatment of hypertension usually begins with lifestyle changes. Making these lifestyle changes involves little or no risk. Recommended changes often include reducing the amount of salt in your diet, losing weight if you are overweight or obese, avoiding drinking too much alcohol, stopping smoking and exercising at least 30 minutes per day most days of the week. If you are prescribed medication for your hypertension it is important to take these as prescribed to prevent the possible complications of uncontrolled hypertension.    Diagnosis: Knee contusion  Assessment and Plan of Treatment: PRINCIPAL DISCHARGE DIAGNOSIS  Diagnosis: Falls frequently  Assessment and Plan of Treatment: You have had several falls in the past. In the hospital, we noticed you were dehydrated based on your blood pressure when lying and standing. When you are severely dehydrated, you can have orthostatic hypotension which may have caused you to lose balance and fall. There were not any broken or dislocated bones in your hip, knee, and ankle. We had physical therapy see you who recommended that you go to rehab to improve your strength and mobility. This can prevent further falls and make you able to walk more independently.      SECONDARY DISCHARGE DIAGNOSES  Diagnosis: Hypertension, unspecified type  Assessment and Plan of Treatment: We increased your norvasc from 5 to 20 mg  Hypertension is the medical term for high blood pressure. Blood pressure refers to the pressure that blood applies to the inner walls of the arteries. Arteries carry blood from the heart to other organs and parts of the body. Untreated high blood pressure increases the strain on the heart and arteries, eventually causing organ damage. High blood pressure increases the risk of heart failure, heart attack (myocardial infarction), stroke, and kidney failure. High blood pressure does not usually cause any symptoms. Treatment of hypertension usually begins with lifestyle changes. Making these lifestyle changes involves little or no risk. Recommended changes often include reducing the amount of salt in your diet, losing weight if you are overweight or obese, avoiding drinking too much alcohol, stopping smoking and exercising at least 30 minutes per day most days of the week. If you are prescribed medication for your hypertension it is important to take these as prescribed to prevent the possible complications of uncontrolled hypertension.    Diagnosis: Edema, unspecified type  Assessment and Plan of Treatment: We noticed that you had swelling of your legs. We performed an ultrasound of your legs to make sure the swelling wasnt due to blood clots in your legs. PRINCIPAL DISCHARGE DIAGNOSIS  Diagnosis: Falls frequently  Assessment and Plan of Treatment: You have had several falls in the past. In the hospital, we noticed you were dehydrated based on your blood pressure when lying and standing. When you are severely dehydrated, you can have orthostatic hypotension which may have caused you to lose balance and fall. There were not any broken or dislocated bones in your hip, knee, and ankle. We had physical therapy see you who recommended that you go to rehab to improve your strength and mobility. This can prevent further falls and make you able to walk more independently.      SECONDARY DISCHARGE DIAGNOSES  Diagnosis: Hypertension, unspecified type  Assessment and Plan of Treatment: We increased your norvasc from 5 to 10 mg.  Hypertension is the medical term for high blood pressure. Blood pressure refers to the pressure that blood applies to the inner walls of the arteries. Arteries carry blood from the heart to other organs and parts of the body. Untreated high blood pressure increases the strain on the heart and arteries, eventually causing organ damage. High blood pressure increases the risk of heart failure, heart attack (myocardial infarction), stroke, and kidney failure. High blood pressure does not usually cause any symptoms. Treatment of hypertension usually begins with lifestyle changes. Making these lifestyle changes involves little or no risk. Recommended changes often include reducing the amount of salt in your diet, losing weight if you are overweight or obese, avoiding drinking too much alcohol, stopping smoking and exercising at least 30 minutes per day most days of the week. If you are prescribed medication for your hypertension it is important to take these as prescribed to prevent the possible complications of uncontrolled hypertension.    Diagnosis: Edema, unspecified type  Assessment and Plan of Treatment: We noticed that you had swelling of your legs. This is likely due to amlodipine side effect. Please follow up with your primary care doctor who can consider doing an ultrasound if they are concerned for blood clots. PRINCIPAL DISCHARGE DIAGNOSIS  Diagnosis: Falls frequently  Assessment and Plan of Treatment: You have had several falls in the past. In the hospital, we noticed you were dehydrated based on your blood pressure when lying and standing. When you are severely dehydrated, you can have orthostatic hypotension which may have caused you to lose balance and fall. There were not any broken or dislocated bones in your hip, knee, and ankle. We had physical therapy see you who recommended that you go to rehab to improve your strength and mobility. This can prevent further falls and make you able to walk more independently.      SECONDARY DISCHARGE DIAGNOSES  Diagnosis: Atrial fibrillation with slow ventricular response  Assessment and Plan of Treatment: During this admission, you were found to have a slow and irregular heart rhythm. You will need to follow up with a heart doctor once discharged to continue to work this up. The name of this irregular heart rhythm is atrial fibrillation. You may need medications and/or a pacemaker to help control it.    Diagnosis: Hypertension, unspecified type  Assessment and Plan of Treatment: We increased your norvasc from 5 to 10 mg. We also stopped your atenolol due to your slow heart rate. Atenolol can slow down your heart rate more.   Hypertension is the medical term for high blood pressure. Blood pressure refers to the pressure that blood applies to the inner walls of the arteries. Arteries carry blood from the heart to other organs and parts of the body. Untreated high blood pressure increases the strain on the heart and arteries, eventually causing organ damage. High blood pressure increases the risk of heart failure, heart attack (myocardial infarction), stroke, and kidney failure. High blood pressure does not usually cause any symptoms. Treatment of hypertension usually begins with lifestyle changes. Making these lifestyle changes involves little or no risk. Recommended changes often include reducing the amount of salt in your diet, losing weight if you are overweight or obese, avoiding drinking too much alcohol, stopping smoking and exercising at least 30 minutes per day most days of the week. If you are prescribed medication for your hypertension it is important to take these as prescribed to prevent the possible complications of uncontrolled hypertension.    Diagnosis: Edema, unspecified type  Assessment and Plan of Treatment: We noticed that you had swelling of your legs. This is likely due to amlodipine side effect. Please follow up with your primary care doctor who can consider doing an ultrasound if they are concerned for blood clots. PRINCIPAL DISCHARGE DIAGNOSIS  Diagnosis: Falls frequently  Assessment and Plan of Treatment: You have had several falls in the past. In the hospital, we noticed you were dehydrated based on your blood pressure when lying and standing. When you are severely dehydrated, you can have orthostatic hypotension which may have caused you to lose balance and fall. There were not any broken or dislocated bones in your hip, knee, and ankle. We had physical therapy see you who recommended that you go to rehab to improve your strength and mobility. This can prevent further falls and make you able to walk more independently.      SECONDARY DISCHARGE DIAGNOSES  Diagnosis: Atrial fibrillation with slow ventricular response  Assessment and Plan of Treatment: During this admission, you were found to have a slow and irregular heart rhythm. You will need to follow up with a heart doctor once discharged to continue to work this up. The name of this irregular heart rhythm is atrial fibrillation. You may need medications and/or a pacemaker to help control it. AT THIS TIME YOU DO NOT NEED A PACEMAKER. FOLLOW UP WITH DR PATRICE CHUA ON    Diagnosis: Edema, unspecified type  Assessment and Plan of Treatment: We noticed that you had swelling of your legs. This is likely due to amlodipine side effect. You had an ultrasound of your legs showing you have a blood clot in your left calf and should continue taking ELIQUIS (APIXABAN) 5mg twice a day as a blood thinner.    Diagnosis: Hypertension, unspecified type  Assessment and Plan of Treatment: We increased your NORVASC (AMLODIPINE) from 5 to 10 mg daily. We also STOP TAKING ATENOLOL due to your slow heart rate. Atenolol can slow down your heart rate more.   Hypertension is the medical term for high blood pressure. Blood pressure refers to the pressure that blood applies to the inner walls of the arteries. Arteries carry blood from the heart to other organs and parts of the body. Untreated high blood pressure increases the strain on the heart and arteries, eventually causing organ damage. High blood pressure increases the risk of heart failure, heart attack (myocardial infarction), stroke, and kidney failure. High blood pressure does not usually cause any symptoms. Treatment of hypertension usually begins with lifestyle changes. Making these lifestyle changes involves little or no risk. Recommended changes often include reducing the amount of salt in your diet, losing weight if you are overweight or obese, avoiding drinking too much alcohol, stopping smoking and exercising at least 30 minutes per day most days of the week. If you are prescribed medication for your hypertension it is important to take these as prescribed to prevent the possible complications of uncontrolled hypertension.

## 2019-12-29 NOTE — DISCHARGE NOTE PROVIDER - NSDCMRMEDTOKEN_GEN_ALL_CORE_FT
amLODIPine 5 mg oral tablet: 1 tab(s) orally once a day  Aspirin Low Dose 81 mg oral delayed release tablet: 1 tab(s) orally once a day  atenolol 50 mg oral tablet: 1 tab(s) orally once a day  gabapentin 100 mg oral capsule: 1 cap(s) orally 3 times a day  tamsulosin 0.4 mg oral capsule: 1 cap(s) orally once a day amLODIPine 10 mg oral tablet: 1 tab(s) orally once a day  Aspirin Low Dose 81 mg oral delayed release tablet: 1 tab(s) orally once a day  carbamide peroxide 6.5% otic solution: 5 drop(s) to each affected ear 2 times a day  gabapentin 100 mg oral capsule: 1 cap(s) orally 3 times a day  ocular lubricant ophthalmic solution: 1 drop(s) to each affected eye 3 times a day  polyethylene glycol 3350 oral powder for reconstitution: 17 gram(s) orally every 12 hours  tamsulosin 0.4 mg oral capsule: 1 cap(s) orally once a day Aspirin Low Dose 81 mg oral delayed release tablet: 1 tab(s) orally once a day  Multiple Vitamins oral tablet: 1 tab(s) orally once a day  polyethylene glycol 3350 oral powder for reconstitution: 17 gram(s) orally every 12 hours  tamsulosin 0.4 mg oral capsule: 1 cap(s) orally once a day amLODIPine 10 mg oral tablet: 1 tab(s) orally once a day  Aspirin Low Dose 81 mg oral delayed release tablet: 1 tab(s) orally once a day  carbamide peroxide 6.5% otic solution: 5 drop(s) to each affected ear 2 times a day  Eliquis 5 mg oral tablet: 1 tab(s) orally 2 times a day   Multiple Vitamins oral tablet: 1 tab(s) orally once a day  ocular lubricant ophthalmic solution: 1 drop(s) to each affected eye 3 times a day  polyethylene glycol 3350 oral powder for reconstitution: 17 gram(s) orally every 12 hours  tamsulosin 0.4 mg oral capsule: 1 cap(s) orally once a day

## 2019-12-29 NOTE — PROGRESS NOTE ADULT - PROBLEM SELECTOR PLAN 10
Lower extremity, L > R, check doppler    ##CKD  Likely baseline  Will consider switching Norvasc to ACE-i

## 2019-12-29 NOTE — DISCHARGE NOTE PROVIDER - HOSPITAL COURSE
88 year old man with past medical history of HTN, and BPH admitted after mechanical fall this AM (12/27) with increased frequency of falls at home (syncopal and mechanical) PT recommending MONIKA. EKG w/o ischemic changes however with mildly elevated troponin (w/o symptoms) trended to clearance.        Falls frequently: Patient with repeated falls as an outpatient, fall on this admission appears mechanical in nature. Orthostatics positive on admission. EKG with 1st degree block. PT initially recommending home with home PT with reevaluation stating pt would benefit from        Orthostatic hypotension: Patient with +orthostatics  noted in ED (180 systolic -> 160 systolic) s/p 500cc of fluid in ED with repeat orthostatics negative.         Elevated troponin: Patient with elevated troponin .03 -> trended to .04. EKG x 2 w/o ischemic changes.        Constipation: Patient with constipation for 5-6 days distended on exam. Senna and miralax and s/p tap water enema x2 with BM         Hypertension: Patient with hypertension non-compliant with amlodipine and atenolol. Restarting home amlodipine 5mg and increased to 10mg.        BPH: Patient with history of BPH w/ multiple UTIs.        LE edema: US showing         New Meds: amlodipine 5 mg 88 year old man with past medical history of HTN, and BPH admitted after mechanical fall this AM (12/27) with increased frequency of falls at home (syncopal and mechanical) PT recommending MONIKA. EKG w/o ischemic changes however with mildly elevated troponin (w/o symptoms) trended to clearance.        Falls frequently: Patient with repeated falls as an outpatient, fall on this admission appears mechanical in nature. Orthostatics positive on admission. EKG with 1st degree block. PT initially recommending home with home PT with reevaluation stating pt would benefit from        Orthostatic hypotension: Patient with +orthostatics  noted in ED (180 systolic -> 160 systolic) s/p 500cc of fluid in ED with repeat orthostatics negative.         Elevated troponin: Patient with elevated troponin .03 -> trended to .04. EKG x 2 w/o ischemic changes.        Constipation: Patient with constipation for 5-6 days distended on exam. Senna and miralax and s/p tap water enema x2 with BM         Hypertension: Patient with hypertension non-compliant with amlodipine and atenolol. Restarting home amlodipine 5mg and increased to 10mg.        BPH: Patient with history of BPH w/ multiple UTIs.        LE edema: US showing         New Meds: amlodipine 10 mg 88 year old man with past medical history of HTN, and BPH admitted after mechanical fall this AM (12/27) with increased frequency of falls at home (syncopal and mechanical) PT recommending MONIKA. EKG w/o ischemic changes however with mildly elevated troponin (w/o symptoms) trended to clearance.        Falls frequently: Patient with repeated falls as an outpatient, fall on this admission appears mechanical in nature. Orthostatics positive on admission. EKG with 1st degree block. PT initially recommending home with home PT with reevaluation stating pt would benefit from        Orthostatic hypotension: Patient with +orthostatics  noted in ED (180 systolic -> 160 systolic) s/p 500cc of fluid in ED with repeat orthostatics negative.         Elevated troponin: Patient with elevated troponin .03 -> trended to .04. EKG x 2 w/o ischemic changes.        Constipation: Patient with constipation for 5-6 days distended on exam. Senna and miralax and s/p tap water enema x2 with BM         Hypertension: Patient with hypertension non-compliant with amlodipine and atenolol. Restarting home amlodipine 5mg and increased to 10mg.        BPH: Patient with history of BPH w/ multiple UTIs.         New Meds: amlodipine 10 mg    Exams to f/u - LE doppler    Labs - none 88 year old man with past medical history of HTN, and BPH admitted after mechanical fall this AM (12/27) with increased frequency of falls at home (syncopal and mechanical) PT recommending MONIKA. EKG w/o ischemic changes however with mildly elevated troponin (w/o symptoms) trended to clearance.        Falls frequently: Patient with repeated falls as an outpatient, fall on this admission appears mechanical in nature. Orthostatics positive on admission. EKG with 1st degree block. PT initially recommending home with home PT with reevaluation stating pt would benefit from        Slow atrial fibrillation: Pt's baseline EKG is NSR with 1st degree AV block. On 1/7, pt was bradycardic with ekg showing slow a.fib. Cards consulted.         Orthostatic hypotension: Patient with +orthostatics  noted in ED (180 systolic -> 160 systolic) s/p 500cc of fluid in ED with repeat orthostatics negative.         Elevated troponin: Patient with elevated troponin .03 -> trended to .04. EKG x 2 w/o ischemic changes.        Constipation: Patient with constipation for 5-6 days distended on exam. Senna and miralax and s/p tap water enema x2 with BM         Hypertension: Patient with hypertension non-compliant with amlodipine and atenolol. Restarting home amlodipine 5mg and increased to 10mg.        BPH: Patient with history of BPH w/ multiple UTIs.         New Meds: amlodipine 10 mg    Exams to f/u - LE doppler    Labs - none 88 year old man with past medical history of HTN, and BPH admitted after mechanical fall this AM (12/27) with increased frequency of falls at home (syncopal and mechanical) PT recommending MONIKA. EKG w/o ischemic changes however with mildly elevated troponin (w/o symptoms) trended to clearance.        Falls frequently: Patient with repeated falls as an outpatient, fall on this admission appears mechanical in nature. Orthostatics positive on admission. EKG with 1st degree block. PT initially recommending home with home PT with reevaluation stating pt would benefit from        Slow atrial fibrillation: Pt's baseline EKG is NSR with 1st degree AV block. On 1/7, pt was bradycardic with ekg showing slow a.fib. Cards consulted.         Orthostatic hypotension: Patient with +orthostatics  noted in ED (180 systolic -> 160 systolic) s/p 500cc of fluid in ED with repeat orthostatics negative.         Elevated troponin: Patient with elevated troponin .03 -> trended to .04. EKG x 2 w/o ischemic changes. No plan for ischemic evaluation.        Constipation: Patient with constipation for 5-6 days distended on exam. Senna and miralax and s/p tap water enema x2 with BM         Hypertension: Patient with hypertension non-compliant with amlodipine and atenolol. Restarting home amlodipine 5mg and increased to 10mg daily        BPH: Patient with history of BPH w/ multiple UTIs.         New Meds: amlodipine 10 mg    Exams to f/u - LE doppler    Labs - none 88 year old man with past medical history of HTN, and BPH admitted after mechanical fall this AM (12/27) with increased frequency of falls at home (syncopal and mechanical) PT recommending MONIKA. EKG w/o ischemic changes however with mildly elevated troponin (w/o symptoms) trended to clearance.        Falls frequently: Patient with repeated falls as an outpatient, fall on this admission appears mechanical in nature. Orthostatics positive on admission. EKG with 1st degree block. PT initially recommending home with home PT with reevaluation stating pt would benefit from        Slow atrial fibrillation: Pt's baseline EKG is NSR with 1st degree AV block. On 1/7, pt was bradycardic with ekg showing slow a.fib. Cards consulted.         Orthostatic hypotension: Patient with +orthostatics  noted in ED (180 systolic -> 160 systolic) s/p 500cc of fluid in ED with repeat orthostatics negative.         Elevated troponin: Patient with elevated troponin .03 -> trended to .04. EKG x 2 w/o ischemic changes. No plan for ischemic evaluation.        Constipation: Patient with constipation for 5-6 days distended on exam. Senna and miralax and s/p tap water enema x2 with BM         Hypertension: Patient with hypertension non-compliant with amlodipine and atenolol. Restarting home amlodipine 5mg and increased to 10mg daily        BPH: Patient with history of BPH w/ multiple UTIs.         New Meds: amlodipine 10 mg    labs- none        Pt was transferred to  [facility] (cardiology) after he was found to be in slow atrial fibrillation which was concerning for potential cause of falls as outpt. Pt converted to NSR on 1/7/2020 and remained in NSR with no pauses or arrhythmias concerning for possible syncope. EP stated that the patient will not need a pacemaker at this time and should follow up with a general cardiologist for further care. PT HAS AN APPOINTMENT WITH DR PATRICE CHUA ON 1/15/2020 AT 230PM

## 2019-12-29 NOTE — DISCHARGE NOTE PROVIDER - CARE PROVIDER_API CALL
Ventura Gonzalez)  Cardiovascular Disease; Internal Medicine  [address] [place] [address]  Phone: [phone]  Fax: [phone]  Follow Up Time:

## 2019-12-29 NOTE — PROGRESS NOTE ADULT - SUBJECTIVE AND OBJECTIVE BOX
HPI:  Pt currently feels well. Has no acute complaints. Denies fevers, chills, SOB, CP. 12 pt ROS is otherwise negative.    Allergies    No Known Allergies    Intolerances    MEDICATIONS  (STANDING):  amLODIPine   Tablet 10 milliGRAM(s) Oral daily  aspirin enteric coated 81 milliGRAM(s) Oral every 24 hours  enoxaparin Injectable 40 milliGRAM(s) SubCutaneous every 24 hours  influenza  Vaccine (HIGH DOSE) 0.5 milliLiter(s) IntraMuscular once  polyethylene glycol 3350 17 Gram(s) Oral every 12 hours  senna 2 Tablet(s) Oral at bedtime  tamsulosin 0.4 milliGRAM(s) Oral at bedtime    MEDICATIONS  (PRN):  acetaminophen   Tablet .. 650 milliGRAM(s) Oral every 6 hours PRN Mild Pain (1 - 3), Moderate Pain (4 - 6)        Drug Dosing Weight  Height (cm): 165.1 (27 Dec 2019 22:39)  Weight (kg): 69.5 (27 Dec 2019 21:58)  BMI (kg/m2): 25.5 (27 Dec 2019 22:39)  BSA (m2): 1.77 (27 Dec 2019 22:39)    PAST MEDICAL & SURGICAL HISTORY:  History of BPH  Hypertension, unspecified type  H/O right knee surgery: 15 yrs ago      FAMILY HISTORY:  No pertinent family history in first degree relatives      Vital Signs Last 24 Hrs  T(C): 36.4 (29 Dec 2019 16:05), Max: 36.8 (29 Dec 2019 05:14)  T(F): 97.6 (29 Dec 2019 16:05), Max: 98.2 (29 Dec 2019 05:14)  HR: 62 (29 Dec 2019 16:05) (62 - 80)  BP: 106/62 (29 Dec 2019 16:05) (106/62 - 165/61)  BP(mean): --  RR: 16 (29 Dec 2019 16:05) (16 - 18)  SpO2: 97% (29 Dec 2019 16:05) (97% - 98%)    PHYSICAL EXAM:    Constitutional: NAD  Eyes: PERRLA  ENMT: MMM  Neck: supple  Back: midline  Respiratory: CTA b/l  Cardiovascular: rrr, s1s2, no m/r/g  Gastrointestinal: soft, distended, non tender, + BS  Genitourinary: condom catheter in place  Extremities: warm  Vascular: b/l LE edema, L > R, + 2 in L, radial pulses b/l intact  Neurological: AAO x 3, CN 2 - 12 grossly intact  Skin: no rash  Lymph Nodes: no LAD  Musculoskeletal: no joint swelling, full ROM of hip and knee b/l  Psychiatric: normal affect        LABS:                        13.8   5.34  )-----------( 219      ( 28 Dec 2019 08:07 )             42.3   12-28    143  |  103  |  15  ----------------------------<  102<H>  4.0   |  26  |  1.19    Ca    8.4      28 Dec 2019 08:07  Mg     1.9     12-28    TPro  5.6<L>  /  Alb  3.3  /  TBili  0.9  /  DBili  x   /  AST  22  /  ALT  21  /  AlkPhos  75  12-28        Urinalysis Basic - ( 27 Dec 2019 13:01 )    Color: Yellow / Appearance: Clear / SG: <=1.005 / pH: x  Gluc: x / Ketone: NEGATIVE  / Bili: Negative / Urobili: 1.0 E.U./dL   Blood: x / Protein: NEGATIVE mg/dL / Nitrite: NEGATIVE   Leuk Esterase: NEGATIVE / RBC: x / WBC x   Sq Epi: x / Non Sq Epi: x / Bacteria: x        EKG:    ECHO, US:    RADIOLOGY:

## 2019-12-30 PROCEDURE — 99232 SBSQ HOSP IP/OBS MODERATE 35: CPT | Mod: GC

## 2019-12-30 RX ORDER — MAGNESIUM SULFATE 500 MG/ML
1 VIAL (ML) INJECTION ONCE
Refills: 0 | Status: COMPLETED | OUTPATIENT
Start: 2019-12-30 | End: 2019-12-30

## 2019-12-30 RX ORDER — ACETAMINOPHEN 500 MG
325 TABLET ORAL ONCE
Refills: 0 | Status: COMPLETED | OUTPATIENT
Start: 2019-12-30 | End: 2019-12-30

## 2019-12-30 RX ADMIN — Medication 100 GRAM(S): at 09:13

## 2019-12-30 RX ADMIN — POLYETHYLENE GLYCOL 3350 17 GRAM(S): 17 POWDER, FOR SOLUTION ORAL at 18:21

## 2019-12-30 RX ADMIN — SENNA PLUS 2 TABLET(S): 8.6 TABLET ORAL at 23:21

## 2019-12-30 RX ADMIN — AMLODIPINE BESYLATE 10 MILLIGRAM(S): 2.5 TABLET ORAL at 06:30

## 2019-12-30 RX ADMIN — TAMSULOSIN HYDROCHLORIDE 0.4 MILLIGRAM(S): 0.4 CAPSULE ORAL at 23:21

## 2019-12-30 RX ADMIN — Medication 650 MILLIGRAM(S): at 06:53

## 2019-12-30 RX ADMIN — Medication 650 MILLIGRAM(S): at 05:53

## 2019-12-30 RX ADMIN — ENOXAPARIN SODIUM 40 MILLIGRAM(S): 100 INJECTION SUBCUTANEOUS at 23:21

## 2019-12-30 RX ADMIN — Medication 81 MILLIGRAM(S): at 23:21

## 2019-12-30 RX ADMIN — POLYETHYLENE GLYCOL 3350 17 GRAM(S): 17 POWDER, FOR SOLUTION ORAL at 06:30

## 2019-12-30 NOTE — PROGRESS NOTE ADULT - PROBLEM SELECTOR PLAN 1
Patient with repeated falls as an outpatient, fall on this admission appears mechanical in nature.   Differential includes orthostatic hypotension vs mechanical falls due to OA vs mechanical fall due to alcohol misuse (denies prior hospitalization for withdrawal) vs arrhythmia (less likely 1st degree heart block on EKG x 2).    - collateral from [place] (has received care in the past)  - w/o murmur no indication for echo   - orthostatics positive on admission negative on repeat  - EKG= 1st degree block   -PT now rec MONIKA

## 2019-12-30 NOTE — PROGRESS NOTE ADULT - PROBLEM SELECTOR PLAN 2
Patient with +orthostatics  noted in ED (180 systolic -> 160 systolic) s/p 500cc of fluid in ED   - repeat orthostatics in AM were negative  -encouraged PO intake

## 2019-12-30 NOTE — PROGRESS NOTE ADULT - ASSESSMENT
Patient is a 88 year old man with past medical history of HTN, and BPH admitted after mechanical fall this AM (12/27) with increased frequency of falls at home (syncopal and mechanical). PT recommending MONIKA. EKG w/o ischemic changes however with mildly elevated troponin (w/o symptoms); trended to peak.

## 2019-12-30 NOTE — PROGRESS NOTE ADULT - PROBLEM SELECTOR PLAN 10
Lower extremity, L > R  -doppler with superficial DVT below popliteal vein - will hold off on anticoagulation    ##CKD  Likely baseline  Will consider switching Norvasc to ACE-i

## 2019-12-30 NOTE — PROGRESS NOTE ADULT - SUBJECTIVE AND OBJECTIVE BOX
OVERNIGHT EVENTS: No acute events    SUBJECTIVE / INTERVAL HPI: Patient seen and examined at bedside. Pt c/o ankle pain. Wet read of ankle x-ray without fractures. Pt had 2 BM yesterday. Pt denied chest pain, dyspnea, palpitations, n/v, abd pain.    VITAL SIGNS:  Vital Signs Last 24 Hrs  T(C): 36.6 (30 Dec 2019 09:13), Max: 36.7 (29 Dec 2019 21:16)  T(F): 97.8 (30 Dec 2019 09:13), Max: 98.1 (29 Dec 2019 21:16)  HR: 63 (30 Dec 2019 09:13) (56 - 69)  BP: 135/72 (30 Dec 2019 09:13) (106/62 - 156/76)  BP(mean): --  RR: 18 (30 Dec 2019 09:13) (16 - 18)  SpO2: 98% (30 Dec 2019 09:13) (95% - 98%)    PHYSICAL EXAM:  General: Patient is in NAD  HEENT: NC/AT; PERRL, anicteric sclera; dry MM  Neck: supple  Cardiovascular: +S1/S2, RRR  Respiratory: CTA B/L; no W/R/R  Gastrointestinal: soft, NT/ND; +BS  Extremities: WWP; no edema present  Vascular: 2+ radial pulses B/L  Neurological: AAOx3; no focal deficits     MEDICATIONS:  MEDICATIONS  (STANDING):  amLODIPine   Tablet 10 milliGRAM(s) Oral daily  aspirin enteric coated 81 milliGRAM(s) Oral every 24 hours  enoxaparin Injectable 40 milliGRAM(s) SubCutaneous every 24 hours  influenza  Vaccine (HIGH DOSE) 0.5 milliLiter(s) IntraMuscular once  polyethylene glycol 3350 17 Gram(s) Oral every 12 hours  senna 2 Tablet(s) Oral at bedtime  tamsulosin 0.4 milliGRAM(s) Oral at bedtime    MEDICATIONS  (PRN):  acetaminophen   Tablet .. 650 milliGRAM(s) Oral every 6 hours PRN Mild Pain (1 - 3), Moderate Pain (4 - 6)      ALLERGIES:  Allergies    No Known Allergies    Intolerances        LABS:              CAPILLARY BLOOD GLUCOSE          RADIOLOGY & ADDITIONAL TESTS: Reviewed.

## 2019-12-31 PROCEDURE — 99232 SBSQ HOSP IP/OBS MODERATE 35: CPT | Mod: GC

## 2019-12-31 RX ADMIN — ENOXAPARIN SODIUM 40 MILLIGRAM(S): 100 INJECTION SUBCUTANEOUS at 22:52

## 2019-12-31 RX ADMIN — POLYETHYLENE GLYCOL 3350 17 GRAM(S): 17 POWDER, FOR SOLUTION ORAL at 06:10

## 2019-12-31 RX ADMIN — TAMSULOSIN HYDROCHLORIDE 0.4 MILLIGRAM(S): 0.4 CAPSULE ORAL at 22:52

## 2019-12-31 RX ADMIN — Medication 650 MILLIGRAM(S): at 07:13

## 2019-12-31 RX ADMIN — Medication 81 MILLIGRAM(S): at 22:52

## 2019-12-31 RX ADMIN — AMLODIPINE BESYLATE 10 MILLIGRAM(S): 2.5 TABLET ORAL at 06:10

## 2019-12-31 NOTE — PROGRESS NOTE ADULT - PROBLEM SELECTOR PLAN 1
Patient with repeated falls as an outpatient, fall on this admission appears mechanical in nature.   Differential includes orthostatic hypotension vs mechanical falls due to OA vs mechanical fall due to alcohol misuse (denies prior hospitalization for withdrawal) vs arrhythmia (less likely 1st degree heart block on EKG x 2)  - w/o murmur no indication for echo   - orthostatics positive on admission negative on repeat  - EKG= 1st degree block   -PT for MONIKA    #Ankle pain  -x-ray not showing fractures  -podiatry consulted with orthotics at bedside

## 2019-12-31 NOTE — CONSULT NOTE ADULT - ASSESSMENT
88 year old man with past medical history of HTN, OA, multiple UTI's 2/2 to BPH who presents with intermittent bilateral heel pain 2/2 to b/l Blaine's deformity of calcaneus.  -C/w off loading Z-float boot while in bed  -Frequent checks for pressure injuries to posterior heels  -WBAT bilateral in surgical shoes (dispensed)  -Please call podiatry with any questions

## 2019-12-31 NOTE — CONSULT NOTE ADULT - SUBJECTIVE AND OBJECTIVE BOX
Patient is a 88y old  Male who presents with a chief complaint of Fall (30 Dec 2019 11:00)       HPI:  Patient is a 88 year old man with past medical history of HTN, OA, multiple UTI's 2/2 to BPH who presents for fall this AM. Patient reports this AM he was reaching for the newspaper when his knees buckled. He called his neighbor for help. The neighbor helped him up and EMS was called. Patient denied any head trauma with fall. Patient denies any LOC or pre-syncopal symptoms. Patient states last fall prior to that was 4 days ago cannot remember the events surrounding that fall whether it was mechanical or syncopal. Patient states he has had multiple falls over the past couple of years, some syncopal some mechanical like this mornings fall.   PMHx: HTN, OA, UTI 2/2 to BPH   Allergies: none   PSHx: 15 yrs ago right knee surgery   FHx: parents  of old age, unclear family hx patient w/o siblings or children  SHx: never smoker, drinks 1-2 shots of whisky a day, no drug use. 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 Does not use assistive devices to walk.   ED Course: T 98.2, HR 67, /83, S 97% on RA. Patient labs significant for hypokalemia to 3.2. Troponin .03 with  w/o chest pain. UA negative. CT head negative for intracranial bleed. EKG sinus with 1st degree AV block otherwise w/o ischemic changes. PT eval MONIKA. Patient admitted for MONIKA placement. (27 Dec 2019 18:10)      PAST MEDICAL & SURGICAL HISTORY:  History of BPH  Hypertension, unspecified type  H/O right knee surgery: 15 yrs ago      MEDICATIONS  (STANDING):  amLODIPine   Tablet 10 milliGRAM(s) Oral daily  aspirin enteric coated 81 milliGRAM(s) Oral every 24 hours  enoxaparin Injectable 40 milliGRAM(s) SubCutaneous every 24 hours  influenza  Vaccine (HIGH DOSE) 0.5 milliLiter(s) IntraMuscular once  polyethylene glycol 3350 17 Gram(s) Oral every 12 hours  senna 2 Tablet(s) Oral at bedtime  tamsulosin 0.4 milliGRAM(s) Oral at bedtime    MEDICATIONS  (PRN):  acetaminophen   Tablet .. 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           -  Home Living Status: lives alone in an elevator accessible apartment building           -  Prior Home Care Services:  denied    Functional Level Prior to Admission:           - ADL's:  states he is fully independent           - ambulates without assistive devices    FAMILY HISTORY:  No pertinent family history in first degree relatives      Radiology:    < from: CT Head No Cont (19 @ 16:29) >  EXAM:  CT BRAIN                          PROCEDURE DATE:  2019          INTERPRETATION:  PROCEDURE: CT brain without intravenous contrast    INDICATION: Trauma; status post fall    TECHNIQUE: Multiple axial images were obtained at 5 mm intervals from the skull base to the vertex. Sagittal and coronal reformatted images were obtained from the axial data set. The images were reviewed in brain and bone windows. Some of the images are degraded by motion artifact.    COMPARISON: None    FINDINGS: The CT examination demonstrates age appropriate volume loss. There is no midline shift or extra axial collections. The gray white differentiation appears within normal limits. There is no intracranial hemorrhage. There is patchy hypodensity withinthe periventricular white matter which is nonspecific and may represent the sequela of small vessel ischemic disease. There is punctate hypodensity within the anterior limb of the right internal capsule compatible with lacunar infarct of indeterminate age. There is mild high parietal scalp vertex soft tissue swelling. The bony windows demonstrates no fractures. There is a approximately 6 mm osteoma along the right parietal cortex. The lenses are absent bilaterally which may be secondary to prior cataract surgery. The visualized paranasal sinuses are within normal limits. The mastoid air cells are well aerated.    IMPRESSION: Mild high parietal vertex scalp soft tissue swelling. No intracranial hemorrhage or calvarial fracture. Moderate to severe small vessel ischemic disease.          Vital Signs Last 24 Hrs  T(C): 36.7 (31 Dec 2019 09:14), Max: 37 (31 Dec 2019 05:47)  T(F): 98.1 (31 Dec 2019 09:14), Max: 98.6 (31 Dec 2019 05:47)  HR: 64 (31 Dec 2019 09:14) (51 - 64)  BP: 132/67 (31 Dec 2019 09:14) (119/61 - 143/72)  BP(mean): --  RR: 18 (31 Dec 2019 09:14) (16 - 18)  SpO2: 97% (31 Dec 2019 09:14) (97% - 98%)    REVIEW OF SYSTEMS:    CONSTITUTIONAL:  fatigue  EYES: No eye pain, visual disturbances, or discharge  ENMT:  No difficulty hearing, tinnitus, vertigo; No sinus or throat pain  NECK: No pain or stiffness  BREASTS: No pain, masses, or nipple discharge  RESPIRATORY: No cough, wheezing, chills or hemoptysis; No shortness of breath  CARDIOVASCULAR: No chest pain, palpitations, dizziness, or leg swelling  GASTROINTESTINAL: No abdominal or epigastric pain. No nausea, vomiting, or hematemesis; No diarrhea or constipation. No melena or hematochezia.  GENITOURINARY: No dysuria, frequency, hematuria, or incontinence  NEUROLOGICAL: No headaches, memory loss, loss of strength, numbness, or tremors  SKIN: No itching, burning, rashes, or lesions   LYMPH NODES: No enlarged glands  ENDOCRINE: No heat or cold intolerance; No hair loss  MUSCULOSKELETAL: No joint pain or swelling; No muscle, back, or extremity pain  PSYCHIATRIC: No depression, anxiety, mood swings, or difficulty sleeping  HEME/LYMPH: No easy bruising, or bleeding gums  ALLERGY AND IMMUNOLOGIC: No hives or eczema  VASCULAR: no swelling, erythema        Physical Exam: frail 87 yo AA gentleman lying in semi Pedersen's position, c/o feeling tired    Head: normocephalic, atraumatic    Eyes: PERRLA, EOMI, no nystagmus, sclera anicteric    ENT: nasal discharge, uvula midline, no oropharyngeal erythema/exudate    Neck: supple, negative JVD, negative carotid bruits, no thyromegaly    Chest: CTA bilaterally, neg wheeze/ rhonchi/ rales/ crackles/ egophany    Cardiovascular: regular rate and rhythm, neg murmurs/rubs/gallops    Abdomen: soft, non distended, non tender, negative rebound/guarding, normal bowel sounds, neg hepatosplenomegaly    Extremities: WWP, neg cyanosis/clubbing/edema, negative calf tenderness to palpation, negative Tab's sign    :     Neurologic Exam:    Alert and oriented to person, place, date/year, speech fluent w/o dysarthria, recent and remote memory intact, repetition intact, comprehension intact    Cranial Nerves:     II:                       pupils equal, round and reactive to light, visual fields intact   III/ IV/VI:            extraocular movements intact, neg nystagmus, neg ptosis  V:                       facial sensation intact, V1-3 normal  VII:                     face symmetric, no droop, normal eye closure and smile  VIII:                    hearing intact to finger rub bilaterally  IX/ X:                 soft palate rise symmetrical  XI:                      head turning, shoulder shrug normal  XII:                     tongue midline    Motor Exam:    Upper Extremities:     RIght:    no focal weakness               negative drift    Left :     no focal weakness               negative drift    Lower Extremities:                 Right:     no focal weakness                 Left:        no focal weakness                   Sensory:    intact to LT/PP in all UE/LE dermatomes    DTR:            = biceps/     triceps/     brachioradialis                      = patella/   medial hamstring/ankle                      neg clonus                      neg Babinski                        Finger to Nose:  wnl    Heel to Shin:  wnl    Rapid Alternating movements:  wnl    Joint Position Sense:  intact    Romberg:  not tested    Tandem Walking:  not tested    Gait:  not tested        PM&R Impression:    1) deconditioned  2) no focal weakness        Recommendations:    1) Physical therapy focusing on therapeutic exercises, bed mobility/transfer out of bed evaluation, progressive ambulation with assistive devices prn.    2) Disposition Plan/Recs: subacute rehab placement

## 2019-12-31 NOTE — DIETITIAN INITIAL EVALUATION ADULT. - OTHER INFO
Pt is a 88 y.o M with PMHx significant for HTN, and BPH admitted after mechanical fall (12/27) with increased frequency of falls at home (syncopal and mechanical). PT recommending MONIKA. EKG w/o ischemic changes however with mildly elevated troponin (w/o symptoms); trended to peak.     Pt seen resting in bed, appears to be dozing off throughout the interview and providing limited responses. Pt reports good appetite and PO intake PTA, preparing own meals, 3 meals/day PTA. Typically eats rice, potatoes, carrots and reports adding salt to dishes. Confirms NKFA or dietary restrictions. Denies chewing/swallowing difficulties and reports using full upper and lower dentures. Pt unsure of UBW, noted w/ admit wt 153lbs (12/27). Pt endorses good appetite at present, consuming >75% of meals and reports 100% PO intake for breakfast today (eggs, Kinyarwanda toast, juice, fruit cup). Denies N/V/D/C. +BM 12/29; On bowel regimen noted. No pain reported at this time. Skin: Edema 1+ b/l ankles noted; Stage II sacrum pressure injury noted. Briefly discussed heart-healthy nutrition therapy as pt appeared to be dozing off. Pt reports having had hypertension all his life and is aware of nutrition therapy, however, admits to adding salt to his food PTA. Discussed salt substitutes as an alternative. RD to f/u per protocol. Pt is a 88 y.o M with PMHx significant for HTN, and BPH admitted after mechanical fall (12/27) with increased frequency of falls at home (syncopal and mechanical). PT recommending MONIKA. EKG w/o ischemic changes however with mildly elevated troponin (w/o symptoms); trended to peak.     Pt seen resting in bed, appears to be dozing off throughout the interview and providing limited responses. Pt reports good appetite and PO intake PTA, preparing own meals, 3 meals/day PTA. Typically eats rice, potatoes, carrots and reports adding salt to dishes. Confirms NKFA or dietary restrictions. Denies chewing/swallowing difficulties and reports using full upper and lower dentures. Pt unsure of UBW, noted w/ admit wt 153lbs (12/27). Pt endorses good appetite at present, consuming >75% of meals and reports 100% PO intake for breakfast today (eggs, Syriac toast, juice, fruit cup). Denies N/V/D/C. +BM 12/29; On bowel regimen noted. Per chart, hospital course notable for constipation s/p disimpaction. Pain controlled at this time. Skin: Edema 1+ b/l ankles noted; Stage II sacrum pressure injury noted. Briefly discussed heart-healthy nutrition therapy as pt appeared to be dozing off. Pt reports having had hypertension all his life and is aware of nutrition therapy, however, admits to adding salt to his food PTA. Discussed salt substitutes as an alternative, as well as constipation nutrition therapy. RD to f/u per protocol. Pt is a 88 y.o M with PMHx significant for HTN, and BPH admitted after mechanical fall (12/27) with increased frequency of falls at home (syncopal and mechanical). PT recommending MONIKA. EKG w/o ischemic changes however with mildly elevated troponin (w/o symptoms); trended to peak.     Pt seen resting in bed, appears to be dozing off throughout the interview and providing limited responses. Pt reports good appetite and PO intake PTA, preparing own meals, 3 meals/day PTA. Typically eats rice, potatoes, carrots and reports adding salt to dishes. Confirms NKFA or dietary restrictions. Denies chewing/swallowing difficulties and reports using full upper and lower dentures. Pt unsure of UBW, noted w/ admit wt 153lbs (12/27). Pt endorses good appetite at present, consuming >75% of meals and reports 100% PO intake for breakfast today (eggs, Tajik toast, juice, fruit cup). Denies N/V/D/C. +BM 12/29; On bowel regimen noted. Per chart, hospital course notable for constipation s/p disimpaction. Pain controlled at this time per pt. Skin: Edema 1+ b/l ankles noted; Stage II sacrum pressure injury noted. Briefly discussed heart-healthy nutrition therapy as pt appeared to be dozing off. Pt reports having had hypertension all his life and is aware of nutrition therapy, however, admits to adding salt to his food PTA. Discussed salt substitutes as an alternative, as well as constipation nutrition therapy. RD to f/u per protocol.

## 2019-12-31 NOTE — DIETITIAN INITIAL EVALUATION ADULT. - PROBLEM SELECTOR PLAN 2
Patient with +orthostatics  noted in ED (180 systolic -> 160 systolic) s/p 500cc of fluid in ED   - repeat orthostatics in AM; may be component of frequent falls

## 2019-12-31 NOTE — DIETITIAN INITIAL EVALUATION ADULT. - ADD RECOMMEND
1) Recommend continue with DASH/TLC diet. 2) Encourage PO intake. 3) Monitor lytes and replete prn. 4) Bowel regimen per team discretion. 5) RD to f/u on nutrition education.

## 2019-12-31 NOTE — CONSULT NOTE ADULT - SUBJECTIVE AND OBJECTIVE BOX
Attending: Reggie    Patient is a 88y old  Male who presents with a chief complaint of Fall (31 Dec 2019 11:00)      HPI:  Patient is a 88 year old man with past medical history of HTN, OA, multiple UTI's 2/2 to BPH who presents for fall this AM. Patient reports this AM he was reaching for the newspaper when his knees buckled. He called his neighbor for help. The neighbor helped him up and EMS was called. Patient denied any head trauma with fall. Patient denies any LOC or pre-syncopal symptoms. Patient states last fall prior to that was 4 days ago cannot remember the events surrounding that fall whether it was mechanical or syncopal. Patient states he has had multiple falls over the past couple of years, some syncopal some mechanical like this mornings fall.   PMHx: HTN, OA, UTI 2/2 to BPH   Allergies: none   PSHx: 15 yrs ago right knee surgery   FHx: parents  of old age, unclear family hx patient w/o siblings or children  SHx: never smoker, drinks 1-2 shots of whisky a day, no drug use. 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 Does not use assistive devices to walk.   ED Course: T 98.2, HR 67, /83, S 97% on RA. Patient labs significant for hypokalemia to 3.2. Troponin .03 with  w/o chest pain. UA negative. CT head negative for intracranial bleed. EKG sinus with 1st degree AV block otherwise w/o ischemic changes. PT eval MONIKA. Patient admitted for MONIKA placement. (27 Dec 2019 18:10)    Review of systems negative except per HPI    PAST MEDICAL & SURGICAL HISTORY:  History of BPH  Hypertension, unspecified type  H/O right knee surgery: 15 yrs ago    Home Medications:  amLODIPine 5 mg oral tablet: 1 tab(s) orally once a day (27 Dec 2019 21:33)  Aspirin Low Dose 81 mg oral delayed release tablet: 1 tab(s) orally once a day (27 Dec 2019 21:35)  atenolol 50 mg oral tablet: 1 tab(s) orally once a day (27 Dec 2019 21:32)  gabapentin 100 mg oral capsule: 1 cap(s) orally 3 times a day (27 Dec 2019 21:34)  tamsulosin 0.4 mg oral capsule: 1 cap(s) orally once a day (27 Dec 2019 21:33)    Allergies    No Known Allergies    Intolerances      FAMILY HISTORY:  No pertinent family history in first degree relatives    Social History:       LABS              Vital Signs Last 24 Hrs  T(C): 36.7 (31 Dec 2019 09:14), Max: 37 (31 Dec 2019 05:47)  T(F): 98.1 (31 Dec 2019 09:14), Max: 98.6 (31 Dec 2019 05:47)  HR: 64 (31 Dec 2019 09:14) (51 - 64)  BP: 132/67 (31 Dec 2019 09:14) (119/61 - 143/72)  BP(mean): --  RR: 18 (31 Dec 2019 09:14) (16 - 18)  SpO2: 97% (31 Dec 2019 09:14) (97% - 98%)    PHYSICAL EXAM  General: NAD, AA0x3    Lower Extremity Focused:  Vasc: DP/PT palpable to 1/4 b/l; CFT brisk x10 digits; no edema or erythema  Derm: Skin is clean, dry, and intact. Atopic changes consistent with age.  Neuro: Protective sensation intact b/l  MSK: 5/5 muscle strength in all crural compartment; b/l heels with palpable calcaneal spurs on the posterior lateral aspect. Left is painful to palpation. No breaks in the skin posteriorly.    RADIOLOGY  X-ray, ankle, b/l, resident read: no gas in the soft tissues, no periosteal changes, no fractures or dislocations

## 2019-12-31 NOTE — DIETITIAN INITIAL EVALUATION ADULT. - ENERGY NEEDS
Ht: 65" Wt(12/27):153lbs IBW: 136lbs (+/-10%), 113%IBW, BMI: 25.5 kg/m2   ABW (69.3 kg) used for EER as pt between % of IBW.  Nutrient needs based on [facility] standards of care for older adults.

## 2019-12-31 NOTE — CONSULT NOTE ADULT - ASSESSMENT
per Internal Medicine    Patient is a 88 year old man with past medical history of HTN, and BPH admitted after mechanical fall this AM (12/27) with increased frequency of falls at home (syncopal and mechanical). EKG w/o ischemic changes however with mildly elevated troponin (w/o symptoms); trended to peak.    Problem/Plan - 1:  ·  Problem: Falls frequently.  Plan: Patient with repeated falls as an outpatient, fall on this admission appears mechanical in nature.   Differential includes orthostatic hypotension vs mechanical falls due to OA vs mechanical fall due to alcohol misuse (denies prior hospitalization for withdrawal) vs arrhythmia (less likely 1st degree heart block on EKG x 2).    - collateral from [facility] (has received care in the past)  - w/o murmur no indication for echo   - orthostatics positive on admission negative on repeat  - EKG= 1st degree block     Problem/Plan - 2:  ·  Problem: Orthostatic hypotension.  Plan: Patient with +orthostatics  noted in ED (180 systolic -> 160 systolic) s/p 500cc of fluid in ED   - repeat orthostatics in AM were negative  -encouraged PO intake.     Problem/Plan - 3:  ·  Problem: Elevated troponin.  Plan: Patient with elevated troponin .03 -> trended to .04. EKG x 2 w/o ischemic changes.  -troponin peaked at .04.     Problem/Plan - 4:  ·  Problem: Hypokalemia.  Plan: Resolved.     Problem/Plan - 5:  ·  Problem: Constipation.  Plan: Patient with constipation for 5-6 days distended on exam  - senna 2 tabs   - miralax daily  -Small BM s/p tap water enema  -2 BM s/p disimpaction.     Problem/Plan - 6:  Problem: Hypertension, unspecified type. Plan: Patient with hypertension non-compliant with amlodipine and atenolol  - increased amlodipine to 10 mg  - will hold atenolol.    Problem/Plan - 7:  ·  Problem: History of BPH.  Plan: Patient with history of BPH w/ multiple UTIs  - c/w flomax .4mg.     Problem/Plan - 8:  ·  Problem: Prophylactic measure.  Plan: DVT prophylaxis= Lovenox  F no fluids  E K>4 Mg>2  N DASH diet.     Problem/Plan - 9:  ·  Problem: Transition of care performed with sharing of clinical summary.  Plan: 1) PCP Contacted on Admission: (Y/N) --> Name & Phone #: will need follow up with Dr. Rosado on discharge; contact in AM   2) Date of Contact with PCP:  3) PCP Contacted at Discharge: (Y/N)  4) Summary of Handoff Given to PCP:   5) Post-Discharge Appointment Date and Location:     Problem/Plan - 10:  Problem: Edema, unspecified type. Plan; Lower extremity, L > R  -doppler with superficial DVT below popliteal vein - will hold off on anticoagulation    ##CKD  Likely baseline  Will consider switching Norvasc to ACE-i.

## 2019-12-31 NOTE — DIETITIAN INITIAL EVALUATION ADULT. - PROBLEM SELECTOR PLAN 9
1) PCP Contacted on Admission: (Y/N) --> Name & Phone #: will need follow up with Dr. Rosado on discharge; contact in AM   2) Date of Contact with PCP:  3) PCP Contacted at Discharge: (Y/N)  4) Summary of Handoff Given to PCP:   5) Post-Discharge Appointment Date and Location:

## 2019-12-31 NOTE — DIETITIAN INITIAL EVALUATION ADULT. - PROBLEM SELECTOR PLAN 6
Patient with hypertension non-compliant with amlodipine and atenolol  - restarting home amlodipine 5mg

## 2019-12-31 NOTE — DIETITIAN INITIAL EVALUATION ADULT. - PROBLEM SELECTOR PLAN 1
Patient with repeated falls as an outpatient; fall this AM mechanical in nature. However reports a fall 4 days ago is unsure of events. Last fall prior to that 2 months ago also unsure of events. Patient reports intermittent syncopal and mechanical falls. Differential includes orthostatic hypotension vs mechanical falls due to OA vs mechanical fall due to alcohol misuse (denies prior hospitalization for withdrawal) vs arrhythmia (less likely 1st degree heart block on EKG x 2).    - collateral from [place] (has received care in the past)  - w/o murmur no indication for echo   - orthostatics positive on admission will encourage PO intake; s/p 500cc of fluid in ED and repeat orthostatics in AM; currently hypertensive on vitals with only slightly dry mucous membranes on exam  - EKG= 1st degree block   - PT eval = MONIKA

## 2019-12-31 NOTE — PROGRESS NOTE ADULT - SUBJECTIVE AND OBJECTIVE BOX
OVERNIGHT EVENTS: No acute events    SUBJECTIVE / INTERVAL HPI: Patient seen and examined at bedside. Pt endorsed b/l ankle pain, otherwise denied abd pain, diarrhea, constipation, chest pain, dyspnea.     VITAL SIGNS:  Vital Signs Last 24 Hrs  T(C): 36.6 (31 Dec 2019 18:44), Max: 37 (31 Dec 2019 05:47)  T(F): 97.9 (31 Dec 2019 18:44), Max: 98.6 (31 Dec 2019 05:47)  HR: 76 (31 Dec 2019 18:44) (62 - 76)  BP: 122/74 (31 Dec 2019 18:44) (122/74 - 143/72)  BP(mean): --  RR: 18 (31 Dec 2019 18:44) (18 - 18)  SpO2: 98% (31 Dec 2019 18:44) (97% - 98%)    PHYSICAL EXAM:  General: WDWN; Patient is in NAD  HEENT: NC/AT; PERRL, anicteric sclera; MMM  Neck: supple  Cardiovascular: +S1/S2, RRR  Respiratory: CTA B/L; no W/R/R  Gastrointestinal: soft, NT/ND; +BS  Extremities: WWP; no edema present  Vascular: 2+ radial pulses B/L  Neurological: AAOx3; no focal deficits     MEDICATIONS:  MEDICATIONS  (STANDING):  amLODIPine   Tablet 10 milliGRAM(s) Oral daily  aspirin enteric coated 81 milliGRAM(s) Oral every 24 hours  enoxaparin Injectable 40 milliGRAM(s) SubCutaneous every 24 hours  influenza  Vaccine (HIGH DOSE) 0.5 milliLiter(s) IntraMuscular once  polyethylene glycol 3350 17 Gram(s) Oral every 12 hours  senna 2 Tablet(s) Oral at bedtime  tamsulosin 0.4 milliGRAM(s) Oral at bedtime    MEDICATIONS  (PRN):  acetaminophen   Tablet .. 650 milliGRAM(s) Oral every 6 hours PRN Mild Pain (1 - 3), Moderate Pain (4 - 6)      ALLERGIES:  Allergies    No Known Allergies    Intolerances        LABS:              CAPILLARY BLOOD GLUCOSE          RADIOLOGY & ADDITIONAL TESTS: Reviewed.

## 2019-12-31 NOTE — DIETITIAN INITIAL EVALUATION ADULT. - PROBLEM SELECTOR PLAN 3
Patient with elevated troponin .03 -> trended to .04. EKG x 2 w/o ischemic changes.  - repeat troponin ordered for 10pm; currently w/o symptoms

## 2020-01-01 PROCEDURE — 99232 SBSQ HOSP IP/OBS MODERATE 35: CPT | Mod: GC

## 2020-01-01 RX ADMIN — TAMSULOSIN HYDROCHLORIDE 0.4 MILLIGRAM(S): 0.4 CAPSULE ORAL at 23:50

## 2020-01-01 RX ADMIN — AMLODIPINE BESYLATE 10 MILLIGRAM(S): 2.5 TABLET ORAL at 06:47

## 2020-01-01 RX ADMIN — SENNA PLUS 2 TABLET(S): 8.6 TABLET ORAL at 23:49

## 2020-01-01 RX ADMIN — Medication 81 MILLIGRAM(S): at 23:50

## 2020-01-01 NOTE — PROGRESS NOTE ADULT - SUBJECTIVE AND OBJECTIVE BOX
OVERNIGHT EVENTS: NAEO    SUBJECTIVE / INTERVAL HPI: Patient seen and examined at bedside. Last BM on 12/31. Started dulcolax suppository standing.     VITAL SIGNS:  Vital Signs Last 24 Hrs  T(C): 36.4 (01 Jan 2020 08:50), Max: 36.7 (31 Dec 2019 21:00)  T(F): 97.6 (01 Jan 2020 08:50), Max: 98.1 (31 Dec 2019 21:00)  HR: 75 (01 Jan 2020 08:50) (70 - 79)  BP: 125/67 (01 Jan 2020 08:50) (120/68 - 129/72)  BP(mean): --  RR: 16 (01 Jan 2020 08:50) (16 - 18)  SpO2: 97% (01 Jan 2020 08:50) (95% - 98%)    PHYSICAL EXAM:    General: WDWN  HEENT: NC/AT; PERRL, anicteric sclera; MMM  Neck: supple  Cardiovascular: +S1/S2, RRR  Respiratory: CTA B/L; no W/R/R  Gastrointestinal: distended, non tender, no fluid wave, BS present  Extremities: WWP; no edema, clubbing or cyanosis  Vascular: 2+ radial, DP pulses B/L  Neurological: AAOx3; no focal deficits    MEDICATIONS:  MEDICATIONS  (STANDING):  amLODIPine   Tablet 10 milliGRAM(s) Oral daily  aspirin enteric coated 81 milliGRAM(s) Oral every 24 hours  bisacodyl Suppository 10 milliGRAM(s) Rectal daily  enoxaparin Injectable 40 milliGRAM(s) SubCutaneous every 24 hours  influenza  Vaccine (HIGH DOSE) 0.5 milliLiter(s) IntraMuscular once  polyethylene glycol 3350 17 Gram(s) Oral every 12 hours  senna 2 Tablet(s) Oral at bedtime  tamsulosin 0.4 milliGRAM(s) Oral at bedtime    MEDICATIONS  (PRN):  acetaminophen   Tablet .. 650 milliGRAM(s) Oral every 6 hours PRN Mild Pain (1 - 3), Moderate Pain (4 - 6)      ALLERGIES:  Allergies    No Known Allergies    Intolerances        LABS:              CAPILLARY BLOOD GLUCOSE          RADIOLOGY & ADDITIONAL TESTS: Reviewed. OVERNIGHT EVENTS: NAEO    SUBJECTIVE / INTERVAL HPI: Patient seen and examined at bedside. Last BM on 12/31. Started dulcolax suppository standing. 12 pt ROS is otherwise negative    VITAL SIGNS:  Vital Signs Last 24 Hrs  T(C): 36.4 (01 Jan 2020 08:50), Max: 36.7 (31 Dec 2019 21:00)  T(F): 97.6 (01 Jan 2020 08:50), Max: 98.1 (31 Dec 2019 21:00)  HR: 75 (01 Jan 2020 08:50) (70 - 79)  BP: 125/67 (01 Jan 2020 08:50) (120/68 - 129/72)  BP(mean): --  RR: 16 (01 Jan 2020 08:50) (16 - 18)  SpO2: 97% (01 Jan 2020 08:50) (95% - 98%)    PHYSICAL EXAM:    General: WDWN  HEENT: NC/AT; PERRL, anicteric sclera; MMM  Neck: supple  Cardiovascular: +S1/S2, RRR  Respiratory: CTA B/L; no W/R/R  Gastrointestinal: distended, non tender, no fluid wave, BS present  Extremities: WWP; no edema, clubbing or cyanosis  Vascular: 2+ radial, DP pulses B/L  Neurological: AAOx3; no focal deficits    MEDICATIONS:  MEDICATIONS  (STANDING):  amLODIPine   Tablet 10 milliGRAM(s) Oral daily  aspirin enteric coated 81 milliGRAM(s) Oral every 24 hours  bisacodyl Suppository 10 milliGRAM(s) Rectal daily  enoxaparin Injectable 40 milliGRAM(s) SubCutaneous every 24 hours  influenza  Vaccine (HIGH DOSE) 0.5 milliLiter(s) IntraMuscular once  polyethylene glycol 3350 17 Gram(s) Oral every 12 hours  senna 2 Tablet(s) Oral at bedtime  tamsulosin 0.4 milliGRAM(s) Oral at bedtime    MEDICATIONS  (PRN):  acetaminophen   Tablet .. 650 milliGRAM(s) Oral every 6 hours PRN Mild Pain (1 - 3), Moderate Pain (4 - 6)      ALLERGIES:  Allergies    No Known Allergies    Intolerances        LABS:              CAPILLARY BLOOD GLUCOSE          RADIOLOGY & ADDITIONAL TESTS: Reviewed.

## 2020-01-02 PROCEDURE — 99232 SBSQ HOSP IP/OBS MODERATE 35: CPT | Mod: GC

## 2020-01-02 RX ORDER — CARBAMIDE PEROXIDE 81.86 MG/ML
5 SOLUTION/ DROPS AURICULAR (OTIC)
Refills: 0 | Status: DISCONTINUED | OUTPATIENT
Start: 2020-01-02 | End: 2020-01-10

## 2020-01-02 RX ADMIN — ENOXAPARIN SODIUM 40 MILLIGRAM(S): 100 INJECTION SUBCUTANEOUS at 23:31

## 2020-01-02 RX ADMIN — SENNA PLUS 2 TABLET(S): 8.6 TABLET ORAL at 23:32

## 2020-01-02 RX ADMIN — CARBAMIDE PEROXIDE 5 DROP(S): 81.86 SOLUTION/ DROPS AURICULAR (OTIC) at 18:59

## 2020-01-02 RX ADMIN — Medication 1 DROP(S): at 18:54

## 2020-01-02 RX ADMIN — Medication 81 MILLIGRAM(S): at 23:32

## 2020-01-02 RX ADMIN — TAMSULOSIN HYDROCHLORIDE 0.4 MILLIGRAM(S): 0.4 CAPSULE ORAL at 23:32

## 2020-01-02 RX ADMIN — Medication 650 MILLIGRAM(S): at 14:00

## 2020-01-02 RX ADMIN — Medication 10 MILLIGRAM(S): at 12:56

## 2020-01-02 RX ADMIN — AMLODIPINE BESYLATE 10 MILLIGRAM(S): 2.5 TABLET ORAL at 05:10

## 2020-01-02 RX ADMIN — Medication 1 DROP(S): at 23:32

## 2020-01-02 RX ADMIN — Medication 650 MILLIGRAM(S): at 12:54

## 2020-01-02 NOTE — PROGRESS NOTE ADULT - SUBJECTIVE AND OBJECTIVE BOX
OVERNIGHT EVENTS: No acute events    SUBJECTIVE / INTERVAL HPI: Patient seen and examined at bedside.   Pt denied     VITAL SIGNS:  Vital Signs Last 24 Hrs  T(C): 36.7 (02 Jan 2020 05:18), Max: 36.7 (01 Jan 2020 15:53)  T(F): 98 (02 Jan 2020 05:18), Max: 98.1 (01 Jan 2020 15:53)  HR: 72 (02 Jan 2020 05:18) (62 - 75)  BP: 148/68 (02 Jan 2020 05:18) (119/64 - 148/68)  BP(mean): --  RR: 17 (02 Jan 2020 05:18) (16 - 17)  SpO2: 96% (02 Jan 2020 05:18) (96% - 97%)    PHYSICAL EXAM:    General: WDWN; Patient is in NAD  HEENT: NC/AT; PERRL, anicteric sclera; MMM  Neck: supple  Cardiovascular: +S1/S2, RRR  Respiratory: CTA B/L; no W/R/R  Gastrointestinal: soft, NT/ND; +BS  Extremities: WWP; no edema present  Vascular: 2+ radial pulses B/L  Neurological: AAOx3; no focal deficits; CN II-XII intact; 5/5 strength in upper and lower extremities; sensation intact to light touch    MEDICATIONS:  MEDICATIONS  (STANDING):  amLODIPine   Tablet 10 milliGRAM(s) Oral daily  aspirin enteric coated 81 milliGRAM(s) Oral every 24 hours  bisacodyl Suppository 10 milliGRAM(s) Rectal daily  enoxaparin Injectable 40 milliGRAM(s) SubCutaneous every 24 hours  influenza  Vaccine (HIGH DOSE) 0.5 milliLiter(s) IntraMuscular once  polyethylene glycol 3350 17 Gram(s) Oral every 12 hours  senna 2 Tablet(s) Oral at bedtime  tamsulosin 0.4 milliGRAM(s) Oral at bedtime    MEDICATIONS  (PRN):  acetaminophen   Tablet .. 650 milliGRAM(s) Oral every 6 hours PRN Mild Pain (1 - 3), Moderate Pain (4 - 6)      ALLERGIES:  Allergies    No Known Allergies    Intolerances        LABS:              CAPILLARY BLOOD GLUCOSE          RADIOLOGY & ADDITIONAL TESTS: Reviewed. OVERNIGHT EVENTS: No acute events    SUBJECTIVE / INTERVAL HPI: Patient seen and examined at bedside. States he has right eye pain. Pt also was dizzy with ambulation yesterday. Reports decreased water intake while hospitalized. Pt denied f/c, n/v, chest pain, dyspnea, constipation, abd pain.     VITAL SIGNS:  Vital Signs Last 24 Hrs  T(C): 36.7 (02 Jan 2020 05:18), Max: 36.7 (01 Jan 2020 15:53)  T(F): 98 (02 Jan 2020 05:18), Max: 98.1 (01 Jan 2020 15:53)  HR: 72 (02 Jan 2020 05:18) (62 - 75)  BP: 148/68 (02 Jan 2020 05:18) (119/64 - 148/68)  BP(mean): --  RR: 17 (02 Jan 2020 05:18) (16 - 17)  SpO2: 96% (02 Jan 2020 05:18) (96% - 97%)    PHYSICAL EXAM:  General: WDWN; Patient is in NAD  HEENT: NC/AT; PERRL, anicteric sclera; MMM; no visible discharge or foreign material in eye.  Neck: supple  Cardiovascular: +S1/S2, RRR  Respiratory: CTA B/L; no W/R/R  Gastrointestinal: soft, NT/ND; +BS  Extremities: WWP; no edema present  Vascular: 2+ radial pulses B/L  Neurological: AAOx3; no focal deficits     MEDICATIONS:  MEDICATIONS  (STANDING):  amLODIPine   Tablet 10 milliGRAM(s) Oral daily  aspirin enteric coated 81 milliGRAM(s) Oral every 24 hours  bisacodyl Suppository 10 milliGRAM(s) Rectal daily  enoxaparin Injectable 40 milliGRAM(s) SubCutaneous every 24 hours  influenza  Vaccine (HIGH DOSE) 0.5 milliLiter(s) IntraMuscular once  polyethylene glycol 3350 17 Gram(s) Oral every 12 hours  senna 2 Tablet(s) Oral at bedtime  tamsulosin 0.4 milliGRAM(s) Oral at bedtime    MEDICATIONS  (PRN):  acetaminophen   Tablet .. 650 milliGRAM(s) Oral every 6 hours PRN Mild Pain (1 - 3), Moderate Pain (4 - 6)      ALLERGIES:  Allergies    No Known Allergies    Intolerances        LABS:              CAPILLARY BLOOD GLUCOSE          RADIOLOGY & ADDITIONAL TESTS: Reviewed.

## 2020-01-02 NOTE — OCCUPATIONAL THERAPY INITIAL EVALUATION ADULT - PLANNED THERAPY INTERVENTIONS, OT EVAL
ROM/transfer training/ADL retraining/IADL retraining/balance training/bed mobility training/strengthening

## 2020-01-02 NOTE — PROGRESS NOTE ADULT - SUBJECTIVE AND OBJECTIVE BOX
Physical Medicine and Rehabilitation Progress Note:    Patient is a 88y old  Male who presents with a chief complaint of Fall (2020 08:10)      HPI:  Patient is a 88 year old man with past medical history of HTN, OA, multiple UTI's 2/2 to BPH who presents for fall this AM. Patient reports this AM he was reaching for the newspaper when his knees buckled. He called his neighbor for help. The neighbor helped him up and EMS was called. Patient denied any head trauma with fall. Patient denies any LOC or pre-syncopal symptoms. Patient states last fall prior to that was 4 days ago cannot remember the events surrounding that fall whether it was mechanical or syncopal. Patient states he has had multiple falls over the past couple of years, some syncopal some mechanical like this mornings fall.   PMHx: HTN, OA, UTI 2/2 to BPH   Allergies: none   PSHx: 15 yrs ago right knee surgery   FHx: parents  of old age, unclear family hx patient w/o siblings or children  SHx: never smoker, drinks 1-2 shots of whisky a day, no drug use. 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 Does not use assistive devices to walk.   ED Course: T 98.2, HR 67, /83, S 97% on RA. Patient labs significant for hypokalemia to 3.2. Troponin .03 with  w/o chest pain. UA negative. CT head negative for intracranial bleed. EKG sinus with 1st degree AV block otherwise w/o ischemic changes. PT eval MONIKA. Patient admitted for MONIKA placement. (27 Dec 2019 18:10)                Vital Signs Last 24 Hrs  T(C): 37 (2020 08:55), Max: 37 (2020 08:55)  T(F): 98.6 (2020 08:55), Max: 98.6 (2020 08:55)  HR: 62 (2020 08:55) (62 - 72)  BP: 134/76 (2020 08:55) (121/72 - 148/68)  BP(mean): --  RR: 18 (2020 08:55) (16 - 18)  SpO2: 98% (2020 08:55) (96% - 98%)    MEDICATIONS  (STANDING):  amLODIPine   Tablet 10 milliGRAM(s) Oral daily  artificial  tears Solution 1 Drop(s) Both EYES three times a day  aspirin enteric coated 81 milliGRAM(s) Oral every 24 hours  bisacodyl Suppository 10 milliGRAM(s) Rectal daily  enoxaparin Injectable 40 milliGRAM(s) SubCutaneous every 24 hours  influenza  Vaccine (HIGH DOSE) 0.5 milliLiter(s) IntraMuscular once  polyethylene glycol 3350 17 Gram(s) Oral every 12 hours  senna 2 Tablet(s) Oral at bedtime  tamsulosin 0.4 milliGRAM(s) Oral at bedtime    MEDICATIONS  (PRN):  acetaminophen   Tablet .. 650 milliGRAM(s) Oral every 6 hours PRN Mild Pain (1 - 3), Moderate Pain (4 - 6)    Currently Undergoing Physical Therapy at bedside.    Functional Status Assessment:    Pain Assessment/Number Scale (0-10) Adult  Presence of Pain: complains of pain/discomfort  Body Location: b/l heels(but did not scale the pain)    Therapeutic Interventions      Bed Mobility  Bed Mobility Training Scooting: moderate assist (50% patient effort);  1 person assist;  verbal cues;  nonverbal cues (demo/gestures)  Bed Mobility Training Sit-to-Supine: moderate assist (50% patient effort);  1 person assist;  verbal cues;  nonverbal cues (demo/gestures)  Bed Mobility Training Supine-to-Sit: moderate assist (50% patient effort);  1 person assist;  verbal cues;  nonverbal cues (demo/gestures);  maximum assist (25% patient effort)  Bed Mobility Training Limitations: decreased ability to use arms for pushing/pulling;  decreased ability to use legs for bridging/pushing;  impaired ability to control trunk for mobility;  decreased strength;  impaired balance;  impaired postural control;  pain    Sit-Stand Transfer Training  Transfer Training Sit-to-Stand Transfer: moderate assist (50% patient effort);  1 person assist;  verbal cues;  nonverbal cues (demo/gestures);  weight-bearing as tolerated   rolling walker;  with post op shoes   Transfer Training Stand-to-Sit Transfer: moderate assist (50% patient effort);  1 person assist;  verbal cues;  nonverbal cues (demo/gestures);  weight-bearing as tolerated   rolling walker;  with post op shoes   Sit-to-Stand Transfer Training Transfer Safety Analysis: decreased balance;  decreased strength;  impaired balance;  impaired postural control;  pain;  decreased safety awareness. ;  rolling walker    Gait Training  Gait Training: moderate assist (50% patient effort);  minimum assist (75% patient effort);  1 person assist;  verbal cues;  nonverbal cues (demo/gestures);  weight-bearing as tolerated   rolling walker;  15 feet to bathroom, then~40 feet x 2   Gait Analysis: 3-point gait   decreased mary jane;  increased time in double stance;  decreased hip/knee flexion;  crouch;  decreased step length;  shuffling;  NBOS, slightly unsteady gait, no lose of balance, decreased heel strike and hip flexion bilaterally. ;  decreased strength;  impaired balance;  pain;  impaired postural control;  rolling walker  Brace/Orthotics Brace/Orthotics: post op shoes   Type of Rest Type of Rest: standing  Duration of Rest Duration of Rest: 3 brief standing breaks(~10 seconds) due to poor endurance.Pt unable to ambulate further distance secondary pt complains feeling tired.             PM&R Impression: as above    Current Disposition Plan Recommendations: subacute rehab placement

## 2020-01-02 NOTE — OCCUPATIONAL THERAPY INITIAL EVALUATION ADULT - LIVES WITH, PROFILE
Pt lives in elevator building without stairs, denies using DME, reports hx of multiple falls in past year./alone

## 2020-01-02 NOTE — PROGRESS NOTE ADULT - PROBLEM SELECTOR PLAN 1
Patient with repeated falls as an outpatient, fall on this admission appears mechanical in nature.   Differential includes orthostatic hypotension vs mechanical falls due to OA vs mechanical fall due to alcohol misuse (denies prior hospitalization for withdrawal) vs arrhythmia (less likely 1st degree heart block on EKG x 2)  - w/o murmur no indication for echo   - orthostatics positive on admission negative on repeat  - EKG= 1st degree block   -PT for MONIKA    #Ankle pain  -x-ray not showing fractures  -podiatry consulted with orthotics at bedside Patient with repeated falls as an outpatient, fall on this admission appears mechanical in nature.   Differential includes orthostatic hypotension vs mechanical falls due to OA vs mechanical fall due to alcohol misuse (denies prior hospitalization for withdrawal) vs arrhythmia (less likely 1st degree heart block on EKG x 2)  - w/o murmur no indication for echo   - orthostatics positive on admission negative on repeat  - EKG= 1st degree block   -PT for MONIKA  -dizziness likely 2/2 deconditioning with negative orthostatics this AM     #Ankle pain  -x-ray not showing fractures  -podiatry consulted with orthotics at bedside

## 2020-01-02 NOTE — PROGRESS NOTE ADULT - ASSESSMENT
per Internal Medicine    Patient is a 88 year old man with past medical history of HTN, and BPH admitted after mechanical fall this AM (12/27) with increased frequency of falls at home (syncopal and mechanical). PT recommending MONIKA. EKG w/o ischemic changes however with mildly elevated troponin (w/o symptoms); trended to peak.    Problem/Plan - 1:  ·  Problem: Falls frequently.  Plan: Patient with repeated falls as an outpatient, fall on this admission appears mechanical in nature.   Differential includes orthostatic hypotension vs mechanical falls due to OA vs mechanical fall due to alcohol misuse (denies prior hospitalization for withdrawal) vs arrhythmia (less likely 1st degree heart block on EKG x 2)  - w/o murmur no indication for echo   - orthostatics positive on admission negative on repeat  - EKG= 1st degree block   -dizziness likely 2/2 deconditioning with negative orthostatics this AM     #Ankle pain  -x-ray not showing fractures  -podiatry consulted with orthotics at bedside.     Problem/Plan - 2:  ·  Problem: Orthostatic hypotension.  Plan: Patient with +orthostatics  noted in ED (180 systolic -> 160 systolic) s/p 500cc of fluid in ED   - repeat orthostatics in AM were negative  -encouraged PO intake.     Problem/Plan - 3:  ·  Problem: Elevated troponin.  Plan: Patient with elevated troponin .03 -> trended to .04. EKG x 2 w/o ischemic changes.  -troponin peaked at .04.     Problem/Plan - 4:  ·  Problem: Hypokalemia.  Plan: Resolved.     Problem/Plan - 5:  ·  Problem: Constipation.  Plan: Resolved. Patient with constipation for 5-6 days distended on exam  - senna 2 tabs   - miralax daily  -2 BM s/p disimpaction.     Problem/Plan - 6:  Problem: Hypertension, unspecified type. Plan: Patient with hypertension non-compliant with amlodipine and atenolol  - increased amlodipine to 10 mg  - will not start atenolol 2/2 1st degree AV block.    Problem/Plan - 7:  ·  Problem: History of BPH.  Plan: Patient with history of BPH w/ multiple UTIs  - c/w flomax .4mg.     Problem/Plan - 8:  ·  Problem: Prophylactic measure.  Plan: DVT prophylaxis= Lovenox  F no fluids  E K>4 Mg>2  N DASH diet.

## 2020-01-03 PROCEDURE — 99232 SBSQ HOSP IP/OBS MODERATE 35: CPT | Mod: GC

## 2020-01-03 RX ADMIN — AMLODIPINE BESYLATE 10 MILLIGRAM(S): 2.5 TABLET ORAL at 07:59

## 2020-01-03 RX ADMIN — ENOXAPARIN SODIUM 40 MILLIGRAM(S): 100 INJECTION SUBCUTANEOUS at 22:07

## 2020-01-03 RX ADMIN — Medication 1 DROP(S): at 14:38

## 2020-01-03 RX ADMIN — Medication 81 MILLIGRAM(S): at 22:07

## 2020-01-03 RX ADMIN — Medication 1 DROP(S): at 07:18

## 2020-01-03 RX ADMIN — TAMSULOSIN HYDROCHLORIDE 0.4 MILLIGRAM(S): 0.4 CAPSULE ORAL at 22:07

## 2020-01-03 RX ADMIN — CARBAMIDE PEROXIDE 5 DROP(S): 81.86 SOLUTION/ DROPS AURICULAR (OTIC) at 11:25

## 2020-01-03 RX ADMIN — Medication 1 DROP(S): at 22:08

## 2020-01-03 RX ADMIN — POLYETHYLENE GLYCOL 3350 17 GRAM(S): 17 POWDER, FOR SOLUTION ORAL at 18:46

## 2020-01-03 RX ADMIN — CARBAMIDE PEROXIDE 5 DROP(S): 81.86 SOLUTION/ DROPS AURICULAR (OTIC) at 18:45

## 2020-01-03 NOTE — PROGRESS NOTE ADULT - ASSESSMENT
88 year old man with past medical history of HTN, and BPH admitted after mechanical fall this AM (12/27) with increased frequency of falls at home (syncopal and mechanical). PT recommending MONIKA. EKG w/o ischemic changes however with mildly elevated troponin (w/o symptoms); trended to peak.

## 2020-01-03 NOTE — PROGRESS NOTE ADULT - PROBLEM SELECTOR PLAN 1
Patient with repeated falls as an outpatient, fall on this admission appears mechanical in nature.   Differential includes orthostatic hypotension vs mechanical falls due to OA vs mechanical fall due to alcohol misuse (denies prior hospitalization for withdrawal) vs arrhythmia (less likely 1st degree heart block on EKG x 2)  - w/o murmur no indication for echo   - orthostatics positive on admission negative on repeat  - EKG= 1st degree block   -PT for MONIKA  -dizziness likely 2/2 deconditioning with negative orthostatics this AM     #Ankle pain  -x-ray not showing fractures  -podiatry consulted with orthotics at bedside

## 2020-01-03 NOTE — PROGRESS NOTE ADULT - ASSESSMENT
per Internal Medicine    88 year old man with past medical history of HTN, and BPH admitted after mechanical fall this AM (12/27) with increased frequency of falls at home (syncopal and mechanical). PT recommending MONIKA. EKG w/o ischemic changes however with mildly elevated troponin (w/o symptoms); trended to peak.     Problem/Plan - 1:  ·  Problem: Falls frequently.  Plan: Patient with repeated falls as an outpatient, fall on this admission appears mechanical in nature.   Differential includes orthostatic hypotension vs mechanical falls due to OA vs mechanical fall due to alcohol misuse (denies prior hospitalization for withdrawal) vs arrhythmia (less likely 1st degree heart block on EKG x 2)  - w/o murmur no indication for echo   - orthostatics positive on admission negative on repeat  - EKG= 1st degree block   -PT for MONIKA  -dizziness likely 2/2 deconditioning with negative orthostatics this AM     #Ankle pain  -x-ray not showing fractures  -podiatry consulted with orthotics at bedside.     Problem/Plan - 2:  ·  Problem: Orthostatic hypotension.  Plan: Patient with +orthostatics  noted in ED (180 systolic -> 160 systolic) s/p 500cc of fluid in ED   - repeat orthostatics in AM were negative  -encouraged PO intake.     Problem/Plan - 3:  ·  Problem: Elevated troponin.  Plan: Patient with elevated troponin .03 -> trended to .04. EKG x 2 w/o ischemic changes.  -troponin peaked at .04.     Problem/Plan - 4:  ·  Problem: Hypokalemia.  Plan: Resolved.     Problem/Plan - 5:  ·  Problem: Constipation.  Plan: Resolved. Patient with constipation for 5-6 days distended on exam  - senna 2 tabs   - miralax daily  -2 BM s/p disimpaction.     Problem/Plan - 6:  Problem: Hypertension, unspecified type. Plan: Patient with hypertension non-compliant with amlodipine and atenolol  - increased amlodipine to 10 mg  - will not start atenolol 2/2 1st degree AV block.    Problem/Plan - 7:  ·  Problem: History of BPH.  Plan: Patient with history of BPH w/ multiple UTIs  - c/w flomax .4mg.     Problem/Plan - 8:  ·  Problem: Prophylactic measure.  Plan: DVT prophylaxis= Lovenox  F no fluids  E K>4 Mg>2  N DASH diet.     Problem/Plan - 9:  ·  Problem: Transition of care performed with sharing of clinical summary.  Plan: 1) PCP Contacted on Admission: (Y/N) --> Name & Phone #: will need follow up with Dr. Rosado on discharge; contact in AM   2) Date of Contact with PCP:  3) PCP Contacted at Discharge: (Y/N)  4) Summary of Handoff Given to PCP:   5) Post-Discharge Appointment Date and Location:     Problem/Plan - 10:  Problem: Edema, unspecified type. Plan; Lower extremity, L > R  -doppler with superficial DVT below popliteal vein - will hold off on anticoagulation    ##CKD  Likely baseline  Will consider switching Norvasc to ACE-i.

## 2020-01-03 NOTE — PROGRESS NOTE ADULT - SUBJECTIVE AND OBJECTIVE BOX
OVERNIGHT EVENTS: No acute events    SUBJECTIVE / INTERVAL HPI: Patient seen and examined at bedside. No acute complaints at this time. Pt reports some improvement with Debrox. Pt denied n/v, abd pain, chest pain, dyspnea.    VITAL SIGNS:  Vital Signs Last 24 Hrs  T(C): 36.5 (03 Jan 2020 09:42), Max: 36.5 (03 Jan 2020 09:42)  T(F): 97.7 (03 Jan 2020 09:42), Max: 97.7 (03 Jan 2020 09:42)  HR: 63 (03 Jan 2020 09:42) (61 - 72)  BP: 114/60 (03 Jan 2020 09:42) (114/60 - 133/74)  BP(mean): --  RR: 17 (03 Jan 2020 09:42) (14 - 18)  SpO2: 99% (03 Jan 2020 09:42) (97% - 99%)    PHYSICAL EXAM:  General: Patient is in NAD  HEENT: NC/AT; PERRL, anicteric sclera; MMM  Neck: supple  Cardiovascular: +S1/S2, RRR  Respiratory: CTA B/L; no W/R/R  Gastrointestinal: soft, NT with mild distention; +BS  Extremities: WWP; no edema present  Vascular: 2+ radial pulses B/L  Neurological: AAOx3; no focal deficits    MEDICATIONS:  MEDICATIONS  (STANDING):  amLODIPine   Tablet 10 milliGRAM(s) Oral daily  artificial  tears Solution 1 Drop(s) Both EYES three times a day  aspirin enteric coated 81 milliGRAM(s) Oral every 24 hours  bisacodyl Suppository 10 milliGRAM(s) Rectal daily  carbamide peroxide Otic Solution 5 Drop(s) Both Ears two times a day  enoxaparin Injectable 40 milliGRAM(s) SubCutaneous every 24 hours  influenza  Vaccine (HIGH DOSE) 0.5 milliLiter(s) IntraMuscular once  polyethylene glycol 3350 17 Gram(s) Oral every 12 hours  senna 2 Tablet(s) Oral at bedtime  tamsulosin 0.4 milliGRAM(s) Oral at bedtime    MEDICATIONS  (PRN):  acetaminophen   Tablet .. 650 milliGRAM(s) Oral every 6 hours PRN Mild Pain (1 - 3), Moderate Pain (4 - 6)      ALLERGIES:  Allergies    No Known Allergies    Intolerances        LABS:              CAPILLARY BLOOD GLUCOSE          RADIOLOGY & ADDITIONAL TESTS: Reviewed.

## 2020-01-03 NOTE — PROGRESS NOTE ADULT - SUBJECTIVE AND OBJECTIVE BOX
Physical Medicine and Rehabilitation Progress Note:    Patient is a 88y old  Male who presents with a chief complaint of Fall (2020 09:40)      HPI:  Patient is a 88 year old man with past medical history of HTN, OA, multiple UTI's 2/2 to BPH who presents for fall this AM. Patient reports this AM he was reaching for the newspaper when his knees buckled. He called his neighbor for help. The neighbor helped him up and EMS was called. Patient denied any head trauma with fall. Patient denies any LOC or pre-syncopal symptoms. Patient states last fall prior to that was 4 days ago cannot remember the events surrounding that fall whether it was mechanical or syncopal. Patient states he has had multiple falls over the past couple of years, some syncopal some mechanical like this mornings fall.   PMHx: HTN, OA, UTI 2/2 to BPH   Allergies: none   PSHx: 15 yrs ago right knee surgery   FHx: parents  of old age, unclear family hx patient w/o siblings or children  SHx: never smoker, drinks 1-2 shots of whisky a day, no drug use. 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 Does not use assistive devices to walk.   ED Course: T 98.2, HR 67, /83, S 97% on RA. Patient labs significant for hypokalemia to 3.2. Troponin .03 with  w/o chest pain. UA negative. CT head negative for intracranial bleed. EKG sinus with 1st degree AV block otherwise w/o ischemic changes. PT eval MONIKA. Patient admitted for MONIKA placement. (27 Dec 2019 18:10)                Vital Signs Last 24 Hrs  T(C): 36.5 (2020 09:42), Max: 36.5 (2020 09:42)  T(F): 97.7 (2020 09:42), Max: 97.7 (2020 09:42)  HR: 63 (2020 09:42) (61 - 72)  BP: 114/60 (2020 09:42) (114/60 - 133/74)  BP(mean): --  RR: 17 (2020 09:42) (14 - 18)  SpO2: 99% (2020 09:42) (97% - 99%)    MEDICATIONS  (STANDING):  amLODIPine   Tablet 10 milliGRAM(s) Oral daily  artificial  tears Solution 1 Drop(s) Both EYES three times a day  aspirin enteric coated 81 milliGRAM(s) Oral every 24 hours  bisacodyl Suppository 10 milliGRAM(s) Rectal daily  carbamide peroxide Otic Solution 5 Drop(s) Both Ears two times a day  enoxaparin Injectable 40 milliGRAM(s) SubCutaneous every 24 hours  influenza  Vaccine (HIGH DOSE) 0.5 milliLiter(s) IntraMuscular once  polyethylene glycol 3350 17 Gram(s) Oral every 12 hours  senna 2 Tablet(s) Oral at bedtime  tamsulosin 0.4 milliGRAM(s) Oral at bedtime    MEDICATIONS  (PRN):  acetaminophen   Tablet .. 650 milliGRAM(s) Oral every 6 hours PRN Mild Pain (1 - 3), Moderate Pain (4 - 6)    Currently Undergoing Physical Therapy at bedside.    Functional Status Assessment:    Pain Assessment/Number Scale (0-10) Adult  Presence of Pain: complains of pain/discomfort  Body Location: b/l heels   Pain Rating (0-10): Rest: 6   Pain Rating (0-10): Activity: 6     Therapeutic Interventions      Bed Mobility  Bed Mobility Training Scooting: stand-by assist;  1 person assist;  verbal cues;  nonverbal cues (demo/gestures)  Bed Mobility Training Sit-to-Supine: not performed secondary pt left OOB sitting in the chair.   Bed Mobility Training Supine-to-Sit: stand-by assist;  minimum assist (75% patient effort);  1 person assist;  verbal cues;  nonverbal cues (demo/gestures)  Bed Mobility Training Limitations: decreased ability to use arms for pushing/pulling;  impaired ability to control trunk for mobility;  decreased ability to use legs for bridging/pushing;  decreased strength;  impaired balance;  impaired postural control;  pain    Sit-Stand Transfer Training  Transfer Training Sit-to-Stand Transfer: minimum assist (75% patient effort);  1 person assist;  verbal cues;  nonverbal cues (demo/gestures);  weight-bearing as tolerated   rolling walker  Transfer Training Stand-to-Sit Transfer: minimum assist (75% patient effort);  1 person assist;  nonverbal cues (demo/gestures);  verbal cues;  weight-bearing as tolerated   rolling walker;  moderate assist (50% patient effort)  Sit-to-Stand Transfer Training Transfer Safety Analysis: decreased balance;  decreased strength;  impaired balance;  impaired postural control;  pain;  decreased safety awareness, requires verbal cues from PT for proper hands placement. ;  rolling walker    Gait Training  Gait Training: min A --> mod A x 1 ;  verbal cues;  nonverbal cues (demo/gestures);  1 person assist;  weight-bearing as tolerated   rolling walker;  50 feet x 2   Gait Analysis: 3-point gait   decreased mary jane;  increased time in double stance;  decreased hip/knee flexion;  decreased step length;  forward flexed posture, NBOS, decreased heel strike and hip flexion bilaterally, loses balance once and requires mod A to regain balance. ;  shuffling;  impaired balance;  decreased strength;  impaired postural control;  pain;  requires verbal cues from PT to maintain upright posture. ;  rolling walker  Brace/Orthotics Brace/Orthotics: post op shoes bilaterally   Type of Rest Type of Rest: standing  Duration of Rest Duration of Rest: 1 brief standing break ( 20 seconds) secondary pt demonstrates sign of fatigue.     Therapeutic Exercise  Therapeutic Exercise Detail: Seated ther ex: LAQ, hip flexion x 10 bilaterally.             PM&R Impression: as above    Current Disposition Plan Recommendations: subacute rehab placement

## 2020-01-04 PROCEDURE — 99233 SBSQ HOSP IP/OBS HIGH 50: CPT | Mod: GC

## 2020-01-04 RX ADMIN — ENOXAPARIN SODIUM 40 MILLIGRAM(S): 100 INJECTION SUBCUTANEOUS at 22:38

## 2020-01-04 RX ADMIN — CARBAMIDE PEROXIDE 5 DROP(S): 81.86 SOLUTION/ DROPS AURICULAR (OTIC) at 18:23

## 2020-01-04 RX ADMIN — AMLODIPINE BESYLATE 10 MILLIGRAM(S): 2.5 TABLET ORAL at 07:00

## 2020-01-04 RX ADMIN — Medication 1 DROP(S): at 22:38

## 2020-01-04 RX ADMIN — CARBAMIDE PEROXIDE 5 DROP(S): 81.86 SOLUTION/ DROPS AURICULAR (OTIC) at 07:03

## 2020-01-04 RX ADMIN — Medication 1 DROP(S): at 07:00

## 2020-01-04 RX ADMIN — Medication 650 MILLIGRAM(S): at 23:38

## 2020-01-04 RX ADMIN — Medication 1 DROP(S): at 13:26

## 2020-01-04 RX ADMIN — Medication 81 MILLIGRAM(S): at 22:38

## 2020-01-04 RX ADMIN — Medication 650 MILLIGRAM(S): at 22:38

## 2020-01-04 NOTE — PROGRESS NOTE ADULT - ASSESSMENT
88 year old man with past medical history of HTN, and BPH admitted after mechanical fall this AM (12/27) with increased frequency of falls at home (syncopal and mechanical). PT recommending MONIKA, pending auth and placement.

## 2020-01-04 NOTE — PROGRESS NOTE ADULT - PROBLEM SELECTOR PLAN 2
Patient with +orthostatics  noted in ED (180 systolic -> 160 systolic) s/p 500cc of fluid in ED   - repeat orthostatics were negative  -encouraged PO intake

## 2020-01-04 NOTE — PROGRESS NOTE ADULT - SUBJECTIVE AND OBJECTIVE BOX
OVERNIGHT EVENTS: NAEO, 1 BM yesterday    SUBJECTIVE / INTERVAL HPI: Patient seen and examined at bedside. Eating breakfast, no new complaints    VITAL SIGNS:  Vital Signs Last 24 Hrs  T(C): 36.2 (04 Jan 2020 09:25), Max: 36.9 (03 Jan 2020 16:45)  T(F): 97.2 (04 Jan 2020 09:25), Max: 98.4 (03 Jan 2020 16:45)  HR: 64 (04 Jan 2020 09:25) (63 - 67)  BP: 133/71 (04 Jan 2020 09:25) (128/70 - 136/71)  BP(mean): --  RR: 16 (04 Jan 2020 09:25) (16 - 18)  SpO2: 95% (04 Jan 2020 09:25) (95% - 100%)        PHYSICAL EXAM:  General: Patient is in NAD  HEENT: NC/AT; PERRL, anicteric sclera; MMM  Neck: supple  Cardiovascular: +S1/S2, RRR  Respiratory: CTA B/L; no W/R/R  Gastrointestinal: soft, NT with mild distention; +BS  Extremities: WWP; no edema present  Vascular: 2+ radial pulses B/L  Neurological: AAOx3; no focal deficits    MEDICATIONS:  MEDICATIONS  (STANDING):  amLODIPine   Tablet 10 milliGRAM(s) Oral daily  artificial  tears Solution 1 Drop(s) Both EYES three times a day  aspirin enteric coated 81 milliGRAM(s) Oral every 24 hours  bisacodyl Suppository 10 milliGRAM(s) Rectal daily  carbamide peroxide Otic Solution 5 Drop(s) Both Ears two times a day  enoxaparin Injectable 40 milliGRAM(s) SubCutaneous every 24 hours  influenza  Vaccine (HIGH DOSE) 0.5 milliLiter(s) IntraMuscular once  polyethylene glycol 3350 17 Gram(s) Oral every 12 hours  senna 2 Tablet(s) Oral at bedtime  tamsulosin 0.4 milliGRAM(s) Oral at bedtime    MEDICATIONS  (PRN):  acetaminophen   Tablet .. 650 milliGRAM(s) Oral every 6 hours PRN Mild Pain (1 - 3), Moderate Pain (4 - 6)      ALLERGIES:  Allergies    No Known Allergies    Intolerances        LABS:              CAPILLARY BLOOD GLUCOSE          RADIOLOGY & ADDITIONAL TESTS: Reviewed.

## 2020-01-04 NOTE — PROGRESS NOTE ADULT - PROBLEM SELECTOR PLAN 1
Patient with repeated falls as an outpatient, fall on this admission appears mechanical in nature.   Differential includes orthostatic hypotension vs mechanical falls due to OA vs mechanical fall due to alcohol misuse (denies prior hospitalization for withdrawal) vs arrhythmia (less likely 1st degree heart block on EKG x 2)  - w/o murmur no indication for echo   - orthostatics positive on admission negative on repeat  - EKG= 1st degree block   -PT for MONIKA  -dizziness likely 2/2 deconditioning with negative orthostatics    #Ankle pain  -x-ray not showing fractures  -podiatry consulted with orthotics at bedside

## 2020-01-05 PROCEDURE — 99233 SBSQ HOSP IP/OBS HIGH 50: CPT | Mod: GC

## 2020-01-05 RX ORDER — ACETAMINOPHEN 500 MG
325 TABLET ORAL ONCE
Refills: 0 | Status: COMPLETED | OUTPATIENT
Start: 2020-01-05 | End: 2020-01-05

## 2020-01-05 RX ORDER — LIDOCAINE 4 G/100G
1 CREAM TOPICAL ONCE
Refills: 0 | Status: COMPLETED | OUTPATIENT
Start: 2020-01-05 | End: 2020-01-05

## 2020-01-05 RX ADMIN — Medication 325 MILLIGRAM(S): at 03:08

## 2020-01-05 RX ADMIN — SENNA PLUS 2 TABLET(S): 8.6 TABLET ORAL at 22:32

## 2020-01-05 RX ADMIN — Medication 1 DROP(S): at 06:58

## 2020-01-05 RX ADMIN — Medication 1 DROP(S): at 22:32

## 2020-01-05 RX ADMIN — LIDOCAINE 1 PATCH: 4 CREAM TOPICAL at 17:00

## 2020-01-05 RX ADMIN — Medication 1 DROP(S): at 13:45

## 2020-01-05 RX ADMIN — CARBAMIDE PEROXIDE 5 DROP(S): 81.86 SOLUTION/ DROPS AURICULAR (OTIC) at 06:59

## 2020-01-05 RX ADMIN — CARBAMIDE PEROXIDE 5 DROP(S): 81.86 SOLUTION/ DROPS AURICULAR (OTIC) at 17:28

## 2020-01-05 RX ADMIN — LIDOCAINE 1 PATCH: 4 CREAM TOPICAL at 07:00

## 2020-01-05 RX ADMIN — LIDOCAINE 1 PATCH: 4 CREAM TOPICAL at 03:07

## 2020-01-05 RX ADMIN — AMLODIPINE BESYLATE 10 MILLIGRAM(S): 2.5 TABLET ORAL at 06:58

## 2020-01-05 RX ADMIN — Medication 81 MILLIGRAM(S): at 22:32

## 2020-01-05 RX ADMIN — TAMSULOSIN HYDROCHLORIDE 0.4 MILLIGRAM(S): 0.4 CAPSULE ORAL at 22:32

## 2020-01-06 PROCEDURE — 99232 SBSQ HOSP IP/OBS MODERATE 35: CPT | Mod: GC

## 2020-01-06 RX ADMIN — AMLODIPINE BESYLATE 10 MILLIGRAM(S): 2.5 TABLET ORAL at 06:52

## 2020-01-06 RX ADMIN — CARBAMIDE PEROXIDE 5 DROP(S): 81.86 SOLUTION/ DROPS AURICULAR (OTIC) at 18:25

## 2020-01-06 RX ADMIN — Medication 1 DROP(S): at 06:52

## 2020-01-06 RX ADMIN — Medication 1 DROP(S): at 23:22

## 2020-01-06 RX ADMIN — TAMSULOSIN HYDROCHLORIDE 0.4 MILLIGRAM(S): 0.4 CAPSULE ORAL at 23:22

## 2020-01-06 RX ADMIN — ENOXAPARIN SODIUM 40 MILLIGRAM(S): 100 INJECTION SUBCUTANEOUS at 23:25

## 2020-01-06 RX ADMIN — Medication 1 DROP(S): at 14:10

## 2020-01-06 RX ADMIN — Medication 81 MILLIGRAM(S): at 23:22

## 2020-01-06 RX ADMIN — CARBAMIDE PEROXIDE 5 DROP(S): 81.86 SOLUTION/ DROPS AURICULAR (OTIC) at 06:54

## 2020-01-06 NOTE — PROGRESS NOTE ADULT - SUBJECTIVE AND OBJECTIVE BOX
OVERNIGHT EVENTS: No acute events    SUBJECTIVE / INTERVAL HPI: Patient seen and examined at bedside. Reports b/l hip pain with movement which he has been experiencing for several months. Pt denied f/c, chest pain, dyspnea, abd pain, constipation     VITAL SIGNS:  Vital Signs Last 24 Hrs  T(C): 36.4 (06 Jan 2020 10:18), Max: 36.8 (05 Jan 2020 22:38)  T(F): 97.5 (06 Jan 2020 10:18), Max: 98.2 (05 Jan 2020 22:38)  HR: 65 (06 Jan 2020 10:18) (58 - 66)  BP: 135/67 (06 Jan 2020 10:18) (119/64 - 135/67)  BP(mean): --  RR: 20 (06 Jan 2020 10:18) (18 - 20)  SpO2: 99% (06 Jan 2020 10:18) (98% - 100%)    PHYSICAL EXAM:  General: WDWN; Patient is in NAD  HEENT: NC/AT; PERRL, anicteric sclera; MMM  Neck: supple  Cardiovascular: +S1/S2, RRR  Respiratory: CTA B/L; no W/R/R  Gastrointestinal: NT; distended abdomen; +BS  Extremities: WWP; no edema present  Vascular: 2+ radial pulses B/L  Neurological: AAOx3; no focal deficits    MEDICATIONS:  MEDICATIONS  (STANDING):  amLODIPine   Tablet 10 milliGRAM(s) Oral daily  artificial  tears Solution 1 Drop(s) Both EYES three times a day  aspirin enteric coated 81 milliGRAM(s) Oral every 24 hours  bisacodyl Suppository 10 milliGRAM(s) Rectal daily  carbamide peroxide Otic Solution 5 Drop(s) Both Ears two times a day  enoxaparin Injectable 40 milliGRAM(s) SubCutaneous every 24 hours  influenza  Vaccine (HIGH DOSE) 0.5 milliLiter(s) IntraMuscular once  polyethylene glycol 3350 17 Gram(s) Oral every 12 hours  senna 2 Tablet(s) Oral at bedtime  tamsulosin 0.4 milliGRAM(s) Oral at bedtime    MEDICATIONS  (PRN):  acetaminophen   Tablet .. 650 milliGRAM(s) Oral every 6 hours PRN Mild Pain (1 - 3), Moderate Pain (4 - 6)      ALLERGIES:  Allergies    No Known Allergies    Intolerances        LABS:              CAPILLARY BLOOD GLUCOSE          RADIOLOGY & ADDITIONAL TESTS: Reviewed.

## 2020-01-06 NOTE — PROGRESS NOTE ADULT - PROBLEM SELECTOR PLAN 10
Lower extremity, L > R  -doppler with superficial DVT below popliteal vein - will hold off on anticoagulation    ##CKD  Likely baseline  Will consider switching Norvasc to ACE-i Lower extremity, L > R  -doppler with superficial DVT below popliteal vein - will hold off on anticoagulation Lower extremity, L > R  -doppler with superficial DVT below popliteal vein - will hold off on anticoagulation    #CKD, ruled out  -GFR changes most likely due to dehydration

## 2020-01-06 NOTE — PROGRESS NOTE ADULT - ASSESSMENT
per Internal Medicine    88 year old man with past medical history of HTN, and BPH admitted after mechanical fall this AM (12/27) with increased frequency of falls at home (syncopal and mechanical). PT recommending MONIKA, pending auth and placement.     Problem/Plan - 1:  ·  Problem: Falls frequently.  Plan: Patient with repeated falls as an outpatient, fall on this admission appears mechanical in nature.   Differential includes orthostatic hypotension vs mechanical falls due to OA vs mechanical fall due to alcohol misuse (denies prior hospitalization for withdrawal) vs arrhythmia (less likely 1st degree heart block on EKG x 2)  - w/o murmur no indication for echo   - orthostatics positive on admission negative on repeat  - EKG= 1st degree block   -PT for MONIKA  -dizziness likely 2/2 deconditioning with negative orthostatics    #Ankle pain  -x-ray not showing fractures  -podiatry consulted with orthotics at bedside.     Problem/Plan - 2:  ·  Problem: Orthostatic hypotension.  Plan: Patient with +orthostatics  noted in ED (180 systolic -> 160 systolic) s/p 500cc of fluid in ED   - repeat orthostatics were negative  -encouraged PO intake.     Problem/Plan - 3:  ·  Problem: Elevated troponin.  Plan: Patient with elevated troponin .03 -> trended to .04. EKG x 2 w/o ischemic changes.  -troponin peaked at .04.     Problem/Plan - 4:  ·  Problem: Hypokalemia.  Plan: Resolved.     Problem/Plan - 5:  ·  Problem: Constipation.  Plan: Resolved. Patient with constipation for 5-6 days distended on exam  - senna 2 tabs   - miralax daily  -2 BM s/p disimpaction.     Problem/Plan - 6:  Problem: Hypertension, unspecified type. Plan: Patient with hypertension non-compliant with amlodipine and atenolol  - increased amlodipine to 10 mg  - will not start atenolol 2/2 1st degree AV block.    Problem/Plan - 7:  ·  Problem: History of BPH.  Plan: Patient with history of BPH w/ multiple UTIs  - c/w flomax .4mg.     Problem/Plan - 8:  ·  Problem: Prophylactic measure.  Plan: DVT prophylaxis= Lovenox  F no fluids  E K>4 Mg>2  N DASH diet.     Problem/Plan - 9:  ·  Problem: Transition of care performed with sharing of clinical summary.  Plan: 1) PCP Contacted on Admission: (Y/N) --> Name & Phone #: will need follow up with Dr. Rosado on discharge; contact in AM   2) Date of Contact with PCP:  3) PCP Contacted at Discharge: (Y/N)  4) Summary of Handoff Given to PCP:   5) Post-Discharge Appointment Date and Location:     Problem/Plan - 10:  Problem: Edema, unspecified type. Plan; Lower extremity, L > R  -doppler with superficial DVT below popliteal vein - will hold off on anticoagulation.

## 2020-01-06 NOTE — PROGRESS NOTE ADULT - SUBJECTIVE AND OBJECTIVE BOX
Physical Medicine and Rehabilitation Progress Note:    Patient is a 88y old  Male who presents with a chief complaint of Fall (2020 10:54)      HPI:  Patient is a 88 year old man with past medical history of HTN, OA, multiple UTI's 2/2 to BPH who presents for fall this AM. Patient reports this AM he was reaching for the newspaper when his knees buckled. He called his neighbor for help. The neighbor helped him up and EMS was called. Patient denied any head trauma with fall. Patient denies any LOC or pre-syncopal symptoms. Patient states last fall prior to that was 4 days ago cannot remember the events surrounding that fall whether it was mechanical or syncopal. Patient states he has had multiple falls over the past couple of years, some syncopal some mechanical like this mornings fall.   PMHx: HTN, OA, UTI 2/2 to BPH   Allergies: none   PSHx: 15 yrs ago right knee surgery   FHx: parents  of old age, unclear family hx patient w/o siblings or children  SHx: never smoker, drinks 1-2 shots of whisky a day, no drug use. 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 Does not use assistive devices to walk.   ED Course: T 98.2, HR 67, /83, S 97% on RA. Patient labs significant for hypokalemia to 3.2. Troponin .03 with  w/o chest pain. UA negative. CT head negative for intracranial bleed. EKG sinus with 1st degree AV block otherwise w/o ischemic changes. PT eval MONIKA. Patient admitted for MONIKA placement. (27 Dec 2019 18:10)                Vital Signs Last 24 Hrs  T(C): 36.4 (2020 10:18), Max: 36.8 (2020 22:38)  T(F): 97.5 (2020 10:18), Max: 98.2 (2020 22:38)  HR: 65 (2020 10:18) (58 - 66)  BP: 135/67 (2020 10:18) (119/64 - 135/67)  BP(mean): --  RR: 20 (2020 10:18) (18 - 20)  SpO2: 99% (2020 10:18) (98% - 100%)    MEDICATIONS  (STANDING):  amLODIPine   Tablet 10 milliGRAM(s) Oral daily  artificial  tears Solution 1 Drop(s) Both EYES three times a day  aspirin enteric coated 81 milliGRAM(s) Oral every 24 hours  bisacodyl Suppository 10 milliGRAM(s) Rectal daily  carbamide peroxide Otic Solution 5 Drop(s) Both Ears two times a day  enoxaparin Injectable 40 milliGRAM(s) SubCutaneous every 24 hours  influenza  Vaccine (HIGH DOSE) 0.5 milliLiter(s) IntraMuscular once  polyethylene glycol 3350 17 Gram(s) Oral every 12 hours  senna 2 Tablet(s) Oral at bedtime  tamsulosin 0.4 milliGRAM(s) Oral at bedtime    MEDICATIONS  (PRN):  acetaminophen   Tablet .. 650 milliGRAM(s) Oral every 6 hours PRN Mild Pain (1 - 3), Moderate Pain (4 - 6)    Currently Undergoing Physical and Occupational Therapy at bedside.    Functional Status Assessment:      Therapeutic Interventions      Bed Mobility  Bed Mobility Training Scooting: contact guard;  1 person assist  Bed Mobility Training Sit-to-Supine: contact guard;  1 person assist  Bed Mobility Training Supine-to-Sit: contact guard;  1 person assist  Bed Mobility Training Limitations: impaired balance;  decreased strength    Sit-Stand Transfer Training  Transfer Training Sit-to-Stand Transfer: moderate assist (50% patient effort);  1 person assist;  full weight-bearing   rolling walker  Transfer Training Stand-to-Sit Transfer: stand-by assist;  1 person assist;  full weight-bearing   rolling walker  Sit-to-Stand Transfer Training Transfer Safety Analysis: decreased strength;  impaired balance    Upper Body Dressing Training  Upper Body Dressing Training Assistance: minimum assist (75% patient effort);  1 person assist;  impaired balance;  decreased strength    Grooming Training  Grooming Training Assistance: contact guard;  1 person assist;  While brushing teeth at sink ;  impaired balance    Balance Skills Training  Balance Skills Training Sitting Balance: Static: good balance  Balance Skills Sitting Balance: Dynamic: good balance  Balance Skills Sit-to-Stand Balance: poor balance  Balance Skills Standing Balance: Static: fair balance  Balance Skills Standing Balance: Dynamic: fair balance   Pt ambulated 75ft with RW requiring CGA.  Balance Skills Systems Impairment Contributing to Balance Disturbance: musculoskeletal  Balance Skills Identified Impairments Contributing to Balance Disturbance: decreased strength            PM&R Impression: as above    Current Disposition Plan Recommendations: subacute rehab placement

## 2020-01-07 ENCOUNTER — TRANSCRIPTION ENCOUNTER (OUTPATIENT)
Age: 85
End: 2020-01-07

## 2020-01-07 DIAGNOSIS — I48.91 UNSPECIFIED ATRIAL FIBRILLATION: ICD-10-CM

## 2020-01-07 DIAGNOSIS — I82.469 ACUTE EMBOLISM AND THROMBOSIS OF UNSPECIFIED CALF MUSCULAR VEIN: ICD-10-CM

## 2020-01-07 DIAGNOSIS — M25.579 PAIN IN UNSPECIFIED ANKLE AND JOINTS OF UNSPECIFIED FOOT: ICD-10-CM

## 2020-01-07 LAB
ALBUMIN SERPL ELPH-MCNC: 3.5 G/DL — SIGNIFICANT CHANGE UP (ref 3.3–5)
ALP SERPL-CCNC: 94 U/L — SIGNIFICANT CHANGE UP (ref 40–120)
ALT FLD-CCNC: 32 U/L — SIGNIFICANT CHANGE UP (ref 10–45)
ANION GAP SERPL CALC-SCNC: 14 MMOL/L — SIGNIFICANT CHANGE UP (ref 5–17)
APTT BLD: 33.9 SEC — SIGNIFICANT CHANGE UP (ref 27.5–36.3)
AST SERPL-CCNC: 22 U/L — SIGNIFICANT CHANGE UP (ref 10–40)
BILIRUB DIRECT SERPL-MCNC: <0.2 MG/DL — SIGNIFICANT CHANGE UP (ref 0–0.2)
BILIRUB INDIRECT FLD-MCNC: >0.1 MG/DL — LOW (ref 0.2–1)
BILIRUB SERPL-MCNC: 0.3 MG/DL — SIGNIFICANT CHANGE UP (ref 0.2–1.2)
BUN SERPL-MCNC: 38 MG/DL — HIGH (ref 7–23)
CALCIUM SERPL-MCNC: 8.9 MG/DL — SIGNIFICANT CHANGE UP (ref 8.4–10.5)
CHLORIDE SERPL-SCNC: 104 MMOL/L — SIGNIFICANT CHANGE UP (ref 96–108)
CO2 SERPL-SCNC: 23 MMOL/L — SIGNIFICANT CHANGE UP (ref 22–31)
CREAT SERPL-MCNC: 1.64 MG/DL — HIGH (ref 0.5–1.3)
GLUCOSE SERPL-MCNC: 102 MG/DL — HIGH (ref 70–99)
HCT VFR BLD CALC: 42 % — SIGNIFICANT CHANGE UP (ref 39–50)
HGB BLD-MCNC: 13.7 G/DL — SIGNIFICANT CHANGE UP (ref 13–17)
INR BLD: 0.97 — SIGNIFICANT CHANGE UP (ref 0.88–1.16)
MAGNESIUM SERPL-MCNC: 2.1 MG/DL — SIGNIFICANT CHANGE UP (ref 1.6–2.6)
MCHC RBC-ENTMCNC: 29.6 PG — SIGNIFICANT CHANGE UP (ref 27–34)
MCHC RBC-ENTMCNC: 32.6 GM/DL — SIGNIFICANT CHANGE UP (ref 32–36)
MCV RBC AUTO: 90.7 FL — SIGNIFICANT CHANGE UP (ref 80–100)
NRBC # BLD: 0 /100 WBCS — SIGNIFICANT CHANGE UP (ref 0–0)
PLATELET # BLD AUTO: 257 K/UL — SIGNIFICANT CHANGE UP (ref 150–400)
POTASSIUM SERPL-MCNC: 4.5 MMOL/L — SIGNIFICANT CHANGE UP (ref 3.5–5.3)
POTASSIUM SERPL-SCNC: 4.5 MMOL/L — SIGNIFICANT CHANGE UP (ref 3.5–5.3)
PROT SERPL-MCNC: 6.2 G/DL — SIGNIFICANT CHANGE UP (ref 6–8.3)
PROTHROM AB SERPL-ACNC: 11.1 SEC — SIGNIFICANT CHANGE UP (ref 10–12.9)
RBC # BLD: 4.63 M/UL — SIGNIFICANT CHANGE UP (ref 4.2–5.8)
RBC # FLD: 15.4 % — HIGH (ref 10.3–14.5)
SODIUM SERPL-SCNC: 141 MMOL/L — SIGNIFICANT CHANGE UP (ref 135–145)
TSH SERPL-MCNC: 2.07 UIU/ML — SIGNIFICANT CHANGE UP (ref 0.35–4.94)
WBC # BLD: 4.31 K/UL — SIGNIFICANT CHANGE UP (ref 3.8–10.5)
WBC # FLD AUTO: 4.31 K/UL — SIGNIFICANT CHANGE UP (ref 3.8–10.5)

## 2020-01-07 PROCEDURE — 99232 SBSQ HOSP IP/OBS MODERATE 35: CPT

## 2020-01-07 PROCEDURE — 99239 HOSP IP/OBS DSCHRG MGMT >30: CPT | Mod: GC

## 2020-01-07 PROCEDURE — 93010 ELECTROCARDIOGRAM REPORT: CPT

## 2020-01-07 RX ORDER — AMLODIPINE BESYLATE 2.5 MG/1
1 TABLET ORAL
Qty: 0 | Refills: 0 | DISCHARGE
Start: 2020-01-07

## 2020-01-07 RX ORDER — AMLODIPINE BESYLATE 2.5 MG/1
1 TABLET ORAL
Qty: 0 | Refills: 0 | DISCHARGE

## 2020-01-07 RX ORDER — ATENOLOL 25 MG/1
1 TABLET ORAL
Qty: 0 | Refills: 0 | DISCHARGE

## 2020-01-07 RX ORDER — HEPARIN SODIUM 5000 [USP'U]/ML
INJECTION INTRAVENOUS; SUBCUTANEOUS
Qty: 25000 | Refills: 0 | Status: DISCONTINUED | OUTPATIENT
Start: 2020-01-07 | End: 2020-01-08

## 2020-01-07 RX ORDER — POLYETHYLENE GLYCOL 3350 17 G/17G
17 POWDER, FOR SOLUTION ORAL
Qty: 0 | Refills: 0 | DISCHARGE
Start: 2020-01-07

## 2020-01-07 RX ORDER — CARBAMIDE PEROXIDE 81.86 MG/ML
5 SOLUTION/ DROPS AURICULAR (OTIC)
Qty: 0 | Refills: 0 | DISCHARGE
Start: 2020-01-07

## 2020-01-07 RX ADMIN — Medication 81 MILLIGRAM(S): at 22:08

## 2020-01-07 RX ADMIN — Medication 1 DROP(S): at 14:45

## 2020-01-07 RX ADMIN — CARBAMIDE PEROXIDE 5 DROP(S): 81.86 SOLUTION/ DROPS AURICULAR (OTIC) at 17:31

## 2020-01-07 RX ADMIN — TAMSULOSIN HYDROCHLORIDE 0.4 MILLIGRAM(S): 0.4 CAPSULE ORAL at 22:08

## 2020-01-07 RX ADMIN — Medication 650 MILLIGRAM(S): at 18:54

## 2020-01-07 RX ADMIN — Medication 1 DROP(S): at 05:50

## 2020-01-07 RX ADMIN — Medication 1 DROP(S): at 22:08

## 2020-01-07 RX ADMIN — Medication 325 MILLIGRAM(S): at 18:54

## 2020-01-07 RX ADMIN — HEPARIN SODIUM 1200 UNIT(S)/HR: 5000 INJECTION INTRAVENOUS; SUBCUTANEOUS at 23:03

## 2020-01-07 RX ADMIN — CARBAMIDE PEROXIDE 5 DROP(S): 81.86 SOLUTION/ DROPS AURICULAR (OTIC) at 05:52

## 2020-01-07 NOTE — PROGRESS NOTE ADULT - PROBLEM SELECTOR PLAN 2
Lower extremity edema, L > R, since resolved   -doppler with superficial L DVT below popliteal vein 12/29  -DVT occurred while patient on Lovenox if not present on admission, to consider hypercoagulation workup in am Lower extremity edema, L > R, since resolved   -doppler with superficial R calf DVT below popliteal vein 12/29  -DVT occurred while patient on Lovenox ppx, to consider hypercoagulation workup in am

## 2020-01-07 NOTE — PROGRESS NOTE ADULT - ASSESSMENT
per Internal Medicine    88 year old man with past medical history of HTN, and BPH admitted after mechanical fall this AM (12/27) with increased frequency of falls at home (syncopal and mechanical). PT recommending MONIKA, pending auth and placement.     Problem/Plan - 1:  ·  Problem: Falls frequently.  Plan: Patient with repeated falls as an outpatient, fall on this admission appears mechanical in nature.   Differential includes orthostatic hypotension vs mechanical falls due to OA vs mechanical fall due to alcohol misuse (denies prior hospitalization for withdrawal) vs arrhythmia (less likely 1st degree heart block on EKG x 2)  - w/o murmur no indication for echo   - orthostatics positive on admission negative on repeat  - EKG= 1st degree block   -PT for MONIKA  -dizziness likely 2/2 deconditioning with negative orthostatics    #Ankle pain  -x-ray not showing fractures  -podiatry consulted with orthotics at bedside    #Bradycardia - with EKG showing slow atrial fib  - cardiology consulted.     Problem/Plan - 2:  ·  Problem: Orthostatic hypotension.  Plan: Patient with +orthostatics  noted in ED (180 systolic -> 160 systolic) s/p 500cc of fluid in ED   - repeat orthostatics were negative  -encouraged PO intake.     Problem/Plan - 3:  ·  Problem: Elevated troponin.  Plan: Patient with elevated troponin .03 -> trended to .04. EKG x 2 w/o ischemic changes.  -troponin peaked at .04.     Problem/Plan - 4:  ·  Problem: Hypokalemia.  Plan: Resolved.     Problem/Plan - 5:  ·  Problem: Constipation.  Plan: Resolved. Patient with constipation for 5-6 days distended on exam  - senna 2 tabs   - miralax daily  -2 BM s/p disimpaction.     Problem/Plan - 6:  Problem: Hypertension, unspecified type. Plan: Patient with hypertension non-compliant with amlodipine and atenolol  - increased amlodipine to 10 mg  - will not start atenolol 2/2 1st degree AV block.    Problem/Plan - 7:  ·  Problem: History of BPH.  Plan: Patient with history of BPH w/ multiple UTIs  - c/w flomax .4mg.     Problem/Plan - 8:  ·  Problem: Prophylactic measure.  Plan: DVT prophylaxis= Lovenox  F no fluids  E K>4 Mg>2  N DASH diet.

## 2020-01-07 NOTE — PROGRESS NOTE ADULT - PROBLEM SELECTOR PLAN 8
Patient occasionally complaining of ankle pain per medicine resident  -x-ray not showing fractures  -podiatry consulted with orthotics at bedside  - PRN tylenol Patient occasionally complaining of ankle pain per medicine resident  -x-ray not showing fractures  -podiatry consulted with orthotics at bedside  - PRN tylenol    DVT PPX: heparin GGT  Dispo: MONIKA, was approved on 1/7 before needing transfer to [facility]

## 2020-01-07 NOTE — PROGRESS NOTE ADULT - PROBLEM SELECTOR PLAN 7
Patient with history of BPH w/ multiple UTIs  - c/w flomax .4mg Patient with history of BPH w/ multiple UTIs  - c/w flomax 0.4mg

## 2020-01-07 NOTE — PROGRESS NOTE ADULT - SUBJECTIVE AND OBJECTIVE BOX
OVERNIGHT EVENTS: No acute events    SUBJECTIVE / INTERVAL HPI: Patient seen and examined at bedside. No acute complaints. Pt denied dyspnea, chest pain, n/v, abd pain, d/c.    VITAL SIGNS:  Vital Signs Last 24 Hrs  T(C): 36.5 (07 Jan 2020 08:41), Max: 36.9 (07 Jan 2020 06:45)  T(F): 97.7 (07 Jan 2020 08:41), Max: 98.4 (07 Jan 2020 06:45)  HR: 55 (07 Jan 2020 08:41) (42 - 73)  BP: 103/55 (07 Jan 2020 08:41) (92/55 - 159/80)  BP(mean): --  RR: 18 (07 Jan 2020 08:41) (18 - 20)  SpO2: 100% (07 Jan 2020 08:41) (97% - 100%)    PHYSICAL EXAM:  General: WDWN; Patient is in NAD  HEENT: NC/AT; PERRL, anicteric sclera; MMM  Neck: supple  Cardiovascular: +S1/S2, RRR  Respiratory: CTA B/L; no W/R/R  Gastrointestinal: soft, NT/ND; +BS  Extremities: WWP; no edema present  Vascular: 2+ radial pulses B/L  Neurological: AAOx3; no focal deficits    MEDICATIONS:  MEDICATIONS  (STANDING):  amLODIPine   Tablet 10 milliGRAM(s) Oral daily  artificial  tears Solution 1 Drop(s) Both EYES three times a day  aspirin enteric coated 81 milliGRAM(s) Oral every 24 hours  bisacodyl Suppository 10 milliGRAM(s) Rectal daily  carbamide peroxide Otic Solution 5 Drop(s) Both Ears two times a day  enoxaparin Injectable 40 milliGRAM(s) SubCutaneous every 24 hours  influenza  Vaccine (HIGH DOSE) 0.5 milliLiter(s) IntraMuscular once  polyethylene glycol 3350 17 Gram(s) Oral every 12 hours  senna 2 Tablet(s) Oral at bedtime  tamsulosin 0.4 milliGRAM(s) Oral at bedtime    MEDICATIONS  (PRN):  acetaminophen   Tablet .. 650 milliGRAM(s) Oral every 6 hours PRN Mild Pain (1 - 3), Moderate Pain (4 - 6)      ALLERGIES:  Allergies    No Known Allergies    Intolerances        LABS:              CAPILLARY BLOOD GLUCOSE          RADIOLOGY & ADDITIONAL TESTS: Reviewed. OVERNIGHT EVENTS: Pt was bradycardic to 40s overnight. EKG showing slow atrial fibrillation    SUBJECTIVE / INTERVAL HPI: Patient seen and examined at bedside. No acute complaints. Pt denied dyspnea, chest pain, n/v, abd pain, d/c.    VITAL SIGNS:  Vital Signs Last 24 Hrs  T(C): 36.5 (07 Jan 2020 08:41), Max: 36.9 (07 Jan 2020 06:45)  T(F): 97.7 (07 Jan 2020 08:41), Max: 98.4 (07 Jan 2020 06:45)  HR: 55 (07 Jan 2020 08:41) (42 - 73)  BP: 103/55 (07 Jan 2020 08:41) (92/55 - 159/80)  BP(mean): --  RR: 18 (07 Jan 2020 08:41) (18 - 20)  SpO2: 100% (07 Jan 2020 08:41) (97% - 100%)    PHYSICAL EXAM:  General: WDWN; Patient is in NAD  HEENT: NC/AT; PERRL, anicteric sclera; MMM  Neck: supple  Cardiovascular: +S1/S2, RRR  Respiratory: CTA B/L; no W/R/R  Gastrointestinal: soft, NT/ND; +BS  Extremities: WWP; no edema present  Vascular: 2+ radial pulses B/L  Neurological: AAOx3; no focal deficits    MEDICATIONS:  MEDICATIONS  (STANDING):  amLODIPine   Tablet 10 milliGRAM(s) Oral daily  artificial  tears Solution 1 Drop(s) Both EYES three times a day  aspirin enteric coated 81 milliGRAM(s) Oral every 24 hours  bisacodyl Suppository 10 milliGRAM(s) Rectal daily  carbamide peroxide Otic Solution 5 Drop(s) Both Ears two times a day  enoxaparin Injectable 40 milliGRAM(s) SubCutaneous every 24 hours  influenza  Vaccine (HIGH DOSE) 0.5 milliLiter(s) IntraMuscular once  polyethylene glycol 3350 17 Gram(s) Oral every 12 hours  senna 2 Tablet(s) Oral at bedtime  tamsulosin 0.4 milliGRAM(s) Oral at bedtime    MEDICATIONS  (PRN):  acetaminophen   Tablet .. 650 milliGRAM(s) Oral every 6 hours PRN Mild Pain (1 - 3), Moderate Pain (4 - 6)      ALLERGIES:  Allergies    No Known Allergies    Intolerances        LABS:              CAPILLARY BLOOD GLUCOSE          RADIOLOGY & ADDITIONAL TESTS: Reviewed. Transfer note from [facility] to [email or web address]  88M with past medical history of HTN and BPH admitted after a mechanical fall on 12/27 with increased frequency of falls at home (of both syncopal and mechanical origin). Orthostatics were positive in ED with resolution after fluid bolus. Pt was hypertensive early during admission with home atenolol discontinued due to 1st degree AV block on EKG, and home Norvasc increased to 10 mg for better blood pressure control. During admission, pt was constipated x 5-6 days with pt having bowel movements after disimpaction and heavy bowel regimen. Because of fall, PT was consulted and evaluation for MONIKA. Pt also noted to have RLE edema with doppler showing thrombus within the right intramuscular calf vein with pt not requiring AC. On 1/7, pt was bradycardic to 40s with EKG showing slow atrial fibrillation. Cardiology was consulted due to concern for sick sinus syndrome. Pt transferred to [facility] [[email or web address] for increased monitoring.    OVERNIGHT EVENTS: Pt was bradycardic to 40s overnight. EKG showing slow atrial fibrillation    SUBJECTIVE / INTERVAL HPI: Patient seen and examined at bedside. No acute complaints. Pt denied dyspnea, chest pain, n/v, abd pain, d/c, lightheadedness.    VITAL SIGNS:  Vital Signs Last 24 Hrs  T(C): 36.5 (07 Jan 2020 08:41), Max: 36.9 (07 Jan 2020 06:45)  T(F): 97.7 (07 Jan 2020 08:41), Max: 98.4 (07 Jan 2020 06:45)  HR: 55 (07 Jan 2020 08:41) (42 - 73)  BP: 103/55 (07 Jan 2020 08:41) (92/55 - 159/80)  BP(mean): --  RR: 18 (07 Jan 2020 08:41) (18 - 20)  SpO2: 100% (07 Jan 2020 08:41) (97% - 100%)    PHYSICAL EXAM:  General: WDWN; Patient is in NAD  HEENT: NC/AT; PERRL, anicteric sclera; MMM  Neck: supple  Cardiovascular: +S1/S2, RRR  Respiratory: CTA B/L; no W/R/R  Gastrointestinal: soft, NT/ND; +BS  Extremities: WWP; no edema present  Vascular: 2+ radial pulses B/L  Neurological: AAOx3; no focal deficits    MEDICATIONS:  MEDICATIONS  (STANDING):  amLODIPine   Tablet 10 milliGRAM(s) Oral daily  artificial  tears Solution 1 Drop(s) Both EYES three times a day  aspirin enteric coated 81 milliGRAM(s) Oral every 24 hours  bisacodyl Suppository 10 milliGRAM(s) Rectal daily  carbamide peroxide Otic Solution 5 Drop(s) Both Ears two times a day  enoxaparin Injectable 40 milliGRAM(s) SubCutaneous every 24 hours  influenza  Vaccine (HIGH DOSE) 0.5 milliLiter(s) IntraMuscular once  polyethylene glycol 3350 17 Gram(s) Oral every 12 hours  senna 2 Tablet(s) Oral at bedtime  tamsulosin 0.4 milliGRAM(s) Oral at bedtime    MEDICATIONS  (PRN):  acetaminophen   Tablet .. 650 milliGRAM(s) Oral every 6 hours PRN Mild Pain (1 - 3), Moderate Pain (4 - 6)      ALLERGIES:  Allergies    No Known Allergies    Intolerances        LABS:              CAPILLARY BLOOD GLUCOSE          RADIOLOGY & ADDITIONAL TESTS: Reviewed.

## 2020-01-07 NOTE — PROGRESS NOTE ADULT - ATTENDING COMMENTS
Patient was seen and examined with the resident team today.  I agree with Dr. Barrow''s assessment and plan with the following exceptions/additions:     Briefly, this is an 87yo gentleman with a PMH of HTN and BPH, as well as recurrent mechanical falls, who presented following a mechanical fall at home.  Hospital course notable for constipation s/p disimpaction and distal calf vein occlusive DVT.  Given weaker indications for A/C in asymptomatic patients with distal DVTs, as well as recurrent falls at home and no suspicion for an underlying malignancy, will not A/C.  I suspect he's very sedentary at home.    He was seen by Podiatry today given patient's report of significant bilateral heel pain.  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  Of note, since arriving from the ED to the floor, he has not been mobile.  He has not gotten out of bed and endorses weakness (please refer to PT's re-eval).     -- needs offloading boots while in bed  -- will need surgical shoes for ambulation  -- frequent checks for pressure ulcers  -- appreciate Podiatry's recs  -- c/w Norvasc 10mg daily   -- c/w bowel regimen  -- DVT PPx - Lovenox  -- Dispo - MONIKA; however, Medical Director reviewing case    Kari Stack  [phone]
Patient was seen and examined with the resident team today.  I agree with Dr. Barrow's assessment and plan with the following exceptions/additions:     Briefly, this is an 89yo gentleman with a PMH of HTN and BPH, as well as recurrent mechanical falls, who presented following a mechanical fall at home.  Hospital course notable for constipation s/p disimpaction and distal calf vein occlusive DVT.  Given weaker indications for A/C in asymptomatic patients with distal DVTs, as well as recurrent falls at home and no suspicion for an underlying malignancy, will not A/C.  I suspect he's very sedentary at home.  His insurance has denied MONIKA.  We started an expedited appeal on 1/2.  Still waiting to hear back from his insurance company.      -- needs offloading boots while in bed  -- will need surgical shoes for ambulation  -- frequent checks for pressure ulcers  -- appreciate Podiatry's recs  -- c/w Norvasc 10mg daily   -- c/w bowel regimen  -- DVT PPx - Lovenox  -- Dispo - MONIKA; appeal ongoing    Kari Stack  [phone]
Patient was seen and examined with the resident team today.  I agree with Dr. Barrow's assessment and plan with the following exceptions/additions:     Briefly, this is an 87yo gentleman with a PMH of HTN and BPH, as well as recurrent mechanical falls, who presented following a mechanical fall at home.  Hospital course notable for constipation s/p disimpaction and distal calf vein occlusive DVT.  Given weaker indications for A/C in asymptomatic patients with distal DVTs, as well as recurrent falls at home and no suspicion for an underlying malignancy, will not A/C.  I suspect he's very sedentary at home.    He was seen by Podiatry on 12/31, given patient's report of significant bilateral heel pain.  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  Of note, since arriving from the ED to the floor, he has not been mobile.  He has not gotten out of bed and endorses weakness (please refer to PT's re-eval).  Podiatry offered surgical shoes, and despite this intervention he only walked 8 steps with PT as of 12/31.  His insurance has denies MONIKA.  We started an expedited appearl on 1/2.     -- needs offloading boots while in bed  -- will need surgical shoes for ambulation  -- frequent checks for pressure ulcers  -- appreciate Podiatry's recs  -- c/w Norvasc 10mg daily   -- c/w bowel regimen  -- DVT PPx - Lovenox  -- Dispo - MONIKA per PT re-eval (12/31) and OT eval on (1/2); appeal ongoing    Kari Stack  [phone]
Pt seen and examined by me at bedside earlier in AM. Agree with above and plan discussed in detail with housestaff.  pending appeal for MONIKA
Patient was seen and examined with the resident team today.  I agree with Dr. Barrow''s assessment and plan with the following exceptions/additions:     Briefly, this is an 89yo gentleman with a PMH of HTN and BPH, as well as recurrent mechanical falls, who presented following a mechanical fall at home.  Hospital course notable for constipation s/p disimpaction and distal calf vein occlusive DVT.  Given weaker indications for A/C in asymptomatic patients with distal DVTs, as well as recurrent falls at home and no suspicion for an underlying malignancy, will not A/C.  I suspect he's very sedentary at home.    -- start auth for MONIKA  -- c/w Norvasc 10mg daily   -- c/w bowel regimen  -- DVT PPx - Lovenox  -- Dispo - MONIKA (auth day 0)    Kari Stack  [phone]
Patient was seen and examined with the resident team today.  I agree with Dr. Barrow's assessment and plan with the following exceptions/additions:     Briefly, this is an 89yo gentleman with a PMH of HTN and BPH, as well as recurrent mechanical falls, who presented following a mechanical fall at home.  Hospital course notable for constipation s/p disimpaction and distal calf vein occlusive DVT.  Given weaker indications for A/C in asymptomatic patients with distal DVTs, as well as recurrent falls at home and no suspicion for an underlying malignancy, will not A/C.  I suspect he's very sedentary at home.    He was seen by Podiatry on 12/31, given patient's report of significant bilateral heel pain.  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  Of note, since arriving from the ED to the floor, he has not been mobile.  He has not gotten out of bed and endorses weakness.  Podiatry offered surgical shoes, and despite this intervention he only walked 8 steps with PT as of 12/31.  His insurance has denied MONIKA.  We started an expedited appeal on 1/2.  Still waiting to hear back from his insurance company.      -- needs offloading boots while in bed  -- will need surgical shoes for ambulation  -- frequent checks for pressure ulcers  -- appreciate Podiatry's recs  -- c/w Norvasc 10mg daily   -- c/w bowel regimen  -- DVT PPx - Lovenox  -- Dispo - MONIKA per PT re-eval (12/31) and OT eval on (1/2); appeal ongoing    Kari Stack  [phone]
Patient was seen and examined with the resident team today.  I agree with Dr. Barrow''s assessment and plan with the following exceptions/additions:     Briefly, this is an 89yo gentleman with a PMH of HTN and BPH, as well as recurrent mechanical falls, who presented following a mechanical fall at home.  Hospital course notable for constipation s/p disimpaction and distal calf vein occlusive DVT.  Given weaker indications for A/C in asymptomatic patients with distal DVTs, as well as recurrent falls at home and no suspicion for an underlying malignancy, will not A/C.  I suspect he's very sedentary at home.    He was seen by Podiatry on 12/31, given patient's report of significant bilateral heel pain.  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  Of note, since arriving from the ED to the floor, he has not been mobile.  He has not gotten out of bed and endorses weakness (please refer to PT's re-eval).  Podiatry offered surgical shoes, and despite this patient only walked 8 steps with PT as of 12/31.  His insurance has denies MONIKA.  I will attempt to speak with their MD today to advocate for this patient's needs.     -- needs offloading boots while in bed  -- will need surgical shoes for ambulation  -- frequent checks for pressure ulcers  -- appreciate Podiatry's recs  -- c/w Norvasc 10mg daily   -- c/w bowel regimen  -- DVT PPx - Lovenox  -- Dispo - MONIKA; appeal ongoing    Kari Stack  [phone]

## 2020-01-07 NOTE — DISCHARGE NOTE NURSING/CASE MANAGEMENT/SOCIAL WORK - NSDCFUADDAPPT_GEN_ALL_CORE_FT
Please follow up with your primary care doctor within 1-2 weeks of discharge.  Please follow up with Dr. Gonzalez within 1-2 weeks of discharge. He is a heart doctor who can continue to work up your irregular heart rate. This is important and you need to follow up with a heart doctor soon.

## 2020-01-07 NOTE — DISCHARGE NOTE NURSING/CASE MANAGEMENT/SOCIAL WORK - PATIENT PORTAL LINK FT
You can access the FollowMyHealth Patient Portal offered by [facility] by registering at the following website: [email or web address]. By joining [facility]’s FollowMyHealth portal, you will also be able to view your health information using other applications (apps) compatible with our system.

## 2020-01-07 NOTE — CHART NOTE - NSCHARTNOTEFT_GEN_A_CORE
Admitting Diagnosis:   Patient is a 88y old  Male who presents with a chief complaint of Fall (07 Jan 2020 10:02)      PAST MEDICAL & SURGICAL HISTORY:  History of BPH  Hypertension, unspecified type  H/O right knee surgery: 15 yrs ago      Current Nutrition Order:DASH       PO Intake: Good (%) [ x  ]  Fair (50-75%) [   ] Poor (<25%) [   ]    GI Issues: none    Pain:none    Skin Integrity:stage 2 sacrum    Labs:         CAPILLARY BLOOD GLUCOSE          Medications:  MEDICATIONS  (STANDING):  amLODIPine   Tablet 10 milliGRAM(s) Oral daily  artificial  tears Solution 1 Drop(s) Both EYES three times a day  aspirin enteric coated 81 milliGRAM(s) Oral every 24 hours  bisacodyl Suppository 10 milliGRAM(s) Rectal daily  carbamide peroxide Otic Solution 5 Drop(s) Both Ears two times a day  enoxaparin Injectable 40 milliGRAM(s) SubCutaneous every 24 hours  influenza  Vaccine (HIGH DOSE) 0.5 milliLiter(s) IntraMuscular once  polyethylene glycol 3350 17 Gram(s) Oral every 12 hours  senna 2 Tablet(s) Oral at bedtime  tamsulosin 0.4 milliGRAM(s) Oral at bedtime    MEDICATIONS  (PRN):  acetaminophen   Tablet .. 650 milliGRAM(s) Oral every 6 hours PRN Mild Pain (1 - 3), Moderate Pain (4 - 6)      Weight:69.3kg on admission no updated weights  Daily     Daily     Weight Change: No weight updated    Nutrition Focused Physical Exam: Completed [   ]  Not Pertinent [ x  ]  Muscle Wasting- Temporal [   ]  Clavicle/Pectoral [   ]  Shoulder/Deltoid [   ]  Scapula [   ]  Interosseous [   ]  Quadriceps [   ]  Gastrocnemius [   ]  Fat Wasting- Orbital [   ]  Buccal [   ]  Triceps [   ]  Rib [   ]  Suspect [PCM] 2/2 to physical assessment, [poor intake], and [wt loss]; please see malnutrition chart note.    Estimated energy needs: based on ABW(due to between % of IBW),ABW:69.3kg [id number]:1386-1732kcal and 1-1.2gmprotein:69.3-83gmprotein and 30-35cc fluids:2070-2425cc fluids     Subjective: 89 y/o male with history of HTN,BPH/BPH admitted s/p falL.Pending d/c to MONIKA(pending Auth).Eating adequate amounts without issues     Previous Nutrition Diagnosis:Food an nutrition knowledge deficit r/t no formal teaching AEB: diet recall  Active [   ]  Resolved [ x  ]    If resolved, new PES: Increased protein and nutrient r/t increased demand for protein and nutrients AEB: stage 2 PU     Goal:Meet 80% of needs consistently    Recommendations:1.Updated weights 2.Add MVI to regimen     Education: completed    Risk Level: High [   ] Moderate [ x  ] Low [   ]

## 2020-01-07 NOTE — PROGRESS NOTE ADULT - SUBJECTIVE AND OBJECTIVE BOX
Interventional Cardiology PA TRANSFER NOTE    C.C.:     88M with past medical history of HTN and BPH admitted after a mechanical fall on 12/27 with increased frequency of falls at home (of both syncopal and mechanical origin - xr of hip and knee neg for fracture). Orthostatics were positive in ED with resolution after fluid bolus. Pt was hypertensive early during admission with home atenolol discontinued due to 1st degree AV block on EKG, and home Norvasc increased to 10 mg for better blood pressure control. During admission, pt was constipated x 5-6 days with pt having bowel movements after disimpaction and heavy bowel regimen. Because of fall, PT was consulted and evaluation for MONIKA. Pt also noted to have RLE edema with doppler showing thrombus within the right intramuscular calf vein with pt not requiring AC. On 1/7, pt was bradycardic to 40s with EKG showing slow atrial fibrillation. Cardiology was consulted due to concern for sick sinus syndrome. Pt transferred to [facility] for tele monitoring.    ROS negative except per Subjective and HPI.    O:  	  Medications:    artificial  tears Solution 1 Drop(s) Both EYES three times a day  aspirin enteric coated 81 milliGRAM(s) Oral every 24 hours  carbamide peroxide Otic Solution 5 Drop(s) Both Ears two times a day  enoxaparin Injectable 40 milliGRAM(s) SubCutaneous every 24 hours  influenza  Vaccine (HIGH DOSE) 0.5 milliLiter(s) IntraMuscular once  multivitamin 1 Tablet(s) Oral daily    amLODIPine   Tablet 10 milliGRAM(s) Oral daily  tamsulosin 0.4 milliGRAM(s) Oral at bedtime    acetaminophen   Tablet .. 650 milliGRAM(s) Oral every 6 hours PRN    bisacodyl Suppository 10 milliGRAM(s) Rectal daily  polyethylene glycol 3350 17 Gram(s) Oral every 12 hours  senna 2 Tablet(s) Oral at bedtime      Vitals:  T(C): 36.5 (01-07-20 @ 08:41), Max: 36.9 (01-07-20 @ 06:45)  HR: 55 (01-07-20 @ 08:41) (42 - 55)  BP: 103/55 (01-07-20 @ 08:41) (92/55 - 123/69)  RR: 18 (01-07-20 @ 08:41) (18 - 20)  SpO2: 100% (01-07-20 @ 08:41) (97% - 100%)  Wt(kg): --  I&O's Summary    06 Jan 2020 07:01  -  07 Jan 2020 07:00  --------------------------------------------------------  IN: 0 mL / OUT: 1 mL / NET: -1 mL       Physical Exam:  Appearance: Normal  HEENT: Normal oral mucosa, EOMi, PERRL  Neck: Supple, no JVD  Cardiovascular: Normal S1 S2, no murmurs appreciated  Respiratory: Lungs clear to auscultation mild bibasilar crackles with no rales, rhonchi, wheezing  Gastrointestinal: Soft, non-tender, + BS  Skin: No rashes, ecchymoses, or cyanosis  Extremities: Normal range of motion, no clubbing, cyanosis, or edema  Vascular: Peripheral pulses: Carotid, Radial, Femoral, DP, PT 2+ bilaterally, no Carotid/Femoral bruits  Neurologic: Non-focal  Psychiatry: A&O x 3, mood and affect appropriate     Labs:	none in the last week, patient was awaiting MONIKA placement Interventional Cardiology PA TRANSFER NOTE    C.C.: No complaints    88M with past medical history of HTN and BPH admitted after a mechanical fall on 12/27 with increased frequency of falls at home (of both syncopal and mechanical origin - xr of hip and knee neg for fracture - last syncopal episode one week ago per patient). Orthostatics were positive in ED with resolution after fluid bolus. Pt was hypertensive early during admission with home atenolol discontinued due to 1st degree AV block on EKG, and home Norvasc increased to 10 mg for better blood pressure control. During admission, pt was constipated x 5-6 days with pt having bowel movements after disimpaction and heavy bowel regimen. Because of fall, PT was consulted and evaluation for MONIKA. Pt also noted to have RLE edema with doppler showing thrombus within the right intramuscular calf vein with pt not requiring AC. On 1/7, pt was bradycardic to 39 BPM with EKG showing slow atrial fibrillation, asymptomoatic. Cardiology was consulted due to concern for sick sinus syndrome. Pt transferred to [facility] for tele monitoring. Patient denies CP, SOB, dizziness, palpitations, syncope since admission, orthopnea, PND, LE edema, fatigue, fevers, chills, or melena.     ROS negative except per Subjective and HPI.    O:  	  Medications:    artificial  tears Solution 1 Drop(s) Both EYES three times a day  aspirin enteric coated 81 milliGRAM(s) Oral every 24 hours  carbamide peroxide Otic Solution 5 Drop(s) Both Ears two times a day  enoxaparin Injectable 40 milliGRAM(s) SubCutaneous every 24 hours  influenza  Vaccine (HIGH DOSE) 0.5 milliLiter(s) IntraMuscular once  multivitamin 1 Tablet(s) Oral daily    amLODIPine   Tablet 10 milliGRAM(s) Oral daily  tamsulosin 0.4 milliGRAM(s) Oral at bedtime    acetaminophen   Tablet .. 650 milliGRAM(s) Oral every 6 hours PRN    bisacodyl Suppository 10 milliGRAM(s) Rectal daily  polyethylene glycol 3350 17 Gram(s) Oral every 12 hours  senna 2 Tablet(s) Oral at bedtime      Vitals:  T(C): 36.5 (01-07-20 @ 08:41), Max: 36.9 (01-07-20 @ 06:45)  HR: 55 (01-07-20 @ 08:41) (42 - 55)  BP: 103/55 (01-07-20 @ 08:41) (92/55 - 123/69)  RR: 18 (01-07-20 @ 08:41) (18 - 20)  SpO2: 100% (01-07-20 @ 08:41) (97% - 100%)  Wt(kg): --  I&O's Summary    06 Jan 2020 07:01  -  07 Jan 2020 07:00  --------------------------------------------------------  IN: 0 mL / OUT: 1 mL / NET: -1 mL       Physical Exam:  Appearance: Normal  HEENT: EOMi, NCAT  Neck: Supple, no JVD  Cardiovascular: Normal S1 S2, irregular rhythm, bradycardic, no murmurs appreciated  Respiratory: Lungs clear to auscultation bilaterally  Gastrointestinal: Soft, non-tender, + BS  Skin: No rashes, ecchymoses, or cyanosis. Eschar of R knee (likely from fall).   Extremities: Normal range of motion, no clubbing, cyanosis. Bilateral 2+ pitting edema.   Neurologic: Non-focal  Psychiatry: A&O x 3, mood and affect appropriate     Labs:	none in the last week, patient was awaiting MONIKA placement

## 2020-01-07 NOTE — PROGRESS NOTE ADULT - PROBLEM SELECTOR PROBLEM 10
Edema, unspecified type

## 2020-01-07 NOTE — PROGRESS NOTE ADULT - PROBLEM SELECTOR PLAN 1
Patient with repeated falls as an outpatient, fall on this admission appears mechanical in nature.   Differential includes orthostatic hypotension vs mechanical falls due to OA vs mechanical fall due to alcohol misuse (denies prior hospitalization for withdrawal) vs arrhythmia (less likely 1st degree heart block on EKG x 2)  - w/o murmur no indication for echo   - orthostatics positive on admission negative on repeat  - EKG= 1st degree block   -PT for MONIKA  -dizziness likely 2/2 deconditioning with negative orthostatics    #Ankle pain  -x-ray not showing fractures  -podiatry consulted with orthotics at bedside Patient with repeated falls as an outpatient, fall on this admission appears mechanical in nature.   Differential includes orthostatic hypotension vs mechanical falls due to OA vs mechanical fall due to alcohol misuse (denies prior hospitalization for withdrawal) vs arrhythmia (less likely 1st degree heart block on EKG x 2)  - w/o murmur no indication for echo   - orthostatics positive on admission negative on repeat  - EKG= 1st degree block   -PT for MONIKA  -dizziness likely 2/2 deconditioning with negative orthostatics    #Ankle pain  -x-ray not showing fractures  -podiatry consulted with orthotics at bedside    #Bradycardia - with EKG showing slow atrial fib  - cardiology consulted

## 2020-01-07 NOTE — PROGRESS NOTE ADULT - PROBLEM SELECTOR PROBLEM 9
Transition of care performed with sharing of clinical summary

## 2020-01-07 NOTE — PROGRESS NOTE ADULT - SUBJECTIVE AND OBJECTIVE BOX
Physical Medicine and Rehabilitation Progress Note:    Patient is a 88y old  Male who presents with a chief complaint of Fall (2020 10:02)      HPI:  Patient is a 88 year old man with past medical history of HTN, OA, multiple UTI's 2/2 to BPH who presents for fall this AM. Patient reports this AM he was reaching for the newspaper when his knees buckled. He called his neighbor for help. The neighbor helped him up and EMS was called. Patient denied any head trauma with fall. Patient denies any LOC or pre-syncopal symptoms. Patient states last fall prior to that was 4 days ago cannot remember the events surrounding that fall whether it was mechanical or syncopal. Patient states he has had multiple falls over the past couple of years, some syncopal some mechanical like this mornings fall.   PMHx: HTN, OA, UTI 2/2 to BPH   Allergies: none   PSHx: 15 yrs ago right knee surgery   FHx: parents  of old age, unclear family hx patient w/o siblings or children  SHx: never smoker, drinks 1-2 shots of whisky a day, no drug use. 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 Does not use assistive devices to walk.   ED Course: T 98.2, HR 67, /83, S 97% on RA. Patient labs significant for hypokalemia to 3.2. Troponin .03 with  w/o chest pain. UA negative. CT head negative for intracranial bleed. EKG sinus with 1st degree AV block otherwise w/o ischemic changes. PT eval MONIKA. Patient admitted for MONIKA placement. (27 Dec 2019 18:10)                Vital Signs Last 24 Hrs  T(C): 36.5 (2020 08:41), Max: 36.9 (2020 06:45)  T(F): 97.7 (2020 08:41), Max: 98.4 (2020 06:45)  HR: 55 (2020 08:41) (42 - 55)  BP: 103/55 (2020 08:41) (92/55 - 123/69)  BP(mean): --  RR: 18 (2020 08:41) (18 - 20)  SpO2: 100% (2020 08:41) (97% - 100%)    MEDICATIONS  (STANDING):  amLODIPine   Tablet 10 milliGRAM(s) Oral daily  artificial  tears Solution 1 Drop(s) Both EYES three times a day  aspirin enteric coated 81 milliGRAM(s) Oral every 24 hours  bisacodyl Suppository 10 milliGRAM(s) Rectal daily  carbamide peroxide Otic Solution 5 Drop(s) Both Ears two times a day  enoxaparin Injectable 40 milliGRAM(s) SubCutaneous every 24 hours  influenza  Vaccine (HIGH DOSE) 0.5 milliLiter(s) IntraMuscular once  polyethylene glycol 3350 17 Gram(s) Oral every 12 hours  senna 2 Tablet(s) Oral at bedtime  tamsulosin 0.4 milliGRAM(s) Oral at bedtime    MEDICATIONS  (PRN):  acetaminophen   Tablet .. 650 milliGRAM(s) Oral every 6 hours PRN Mild Pain (1 - 3), Moderate Pain (4 - 6)    Currently Undergoing Physical Therapy at bedside.    Functional Status Assessment:    Therapeutic Interventions      Bed Mobility  Bed Mobility Training Rolling/Turning: supervision;  bed rails  Bed Mobility Training Scooting: supervision;  verbal cues;  bed rails  Bed Mobility Training Sit-to-Supine: minimum assist (75% patient effort);  verbal cues;  nonverbal cues (demo/gestures);  1 person assist;  bed rails  Bed Mobility Training Supine-to-Sit: contact guard;  verbal cues;  1 person assist;  nonverbal cues (demo/gestures);  bed rails  Bed Mobility Training Limitations: decreased ability to use arms for pushing/pulling;  decreased ability to use legs for bridging/pushing;  impaired ability to control trunk for mobility;  decreased strength;  impaired balance    Sit-Stand Transfer Training  Transfer Training Sit-to-Stand Transfer: moderate assist (50% patient effort);  verbal cues;  nonverbal cues (demo/gestures);  1 person assist;  weight-bearing as tolerated   rolling walker  Transfer Training Stand-to-Sit Transfer: contact guard;  verbal cues;  nonverbal cues (demo/gestures);  1 person assist;  weight-bearing as tolerated   rolling walker  Sit-to-Stand Transfer Training Transfer Safety Analysis: decreased balance;  decreased step length;  decreased strength;  impaired balance    Gait Training  Gait Training: minimum assist (75% patient effort);  contact guard;  verbal cues;  nonverbal cues (demo/gestures);  1 person assist;  weight-bearing as tolerated   rolling walker;  35 ft   Gait Analysis: 3-point gait   decreased mary jane;  shuffling;  decreased step length;  decreased stride length;  decreased toe clearance;  decreased strength;  impaired balance;  cognitive, decreased safety awareness  Gait Number of Times:: x 2    Therapeutic Exercise  Therapeutic Exercise Detail: seated: Cally, kenji, ankle pumps x10 each             PM&R Impression: as above    Current Disposition Plan Recommendations: subacute rehab placement, accepted to [facility]

## 2020-01-07 NOTE — PROGRESS NOTE ADULT - PROBLEM SELECTOR PLAN 10
Lower extremity, L > R  -doppler with superficial DVT below popliteal vein - will hold off on anticoagulation    #CKD, ruled out  -GFR changes most likely due to dehydration

## 2020-01-07 NOTE — PROVIDER CONTACT NOTE (CHANGE IN STATUS NOTIFICATION) - ASSESSMENT
labs done and sent to lab. as per pa, am ekg ok from 4 uris. pt to go to echo, monitored when they call, pass on to next shift rn. pt is eating now. plan for npo tonite after md. pt stable, closely monitored. ? plan for heparin drip toniite, pending labs results

## 2020-01-07 NOTE — PROGRESS NOTE ADULT - PROBLEM SELECTOR PLAN 3
Patient with repeated falls as an outpatient, fall on this admission appears mechanical in nature.   Ddx: orthostasis vs mechanical falls due to OA vs mechanical fall due to alcohol misuse (denies prior hospitalization for withdrawal) vs arrhythmia  - Orthostasis resolved with IV fluids  - PT for MONIKA for deconditioning Patient with repeated falls as an outpatient, fall on this admission appears mechanical in nature.   Ddx: orthostasis vs mechanical falls due to OA vs mechanical fall due to alcohol misuse (denies prior hospitalization for withdrawal) vs arrhythmia  - Orthostasis resolved with 500 cc IV fluids  - PT recs for MONIKA for deconditioning

## 2020-01-07 NOTE — PROGRESS NOTE ADULT - ASSESSMENT
88 year old man with past medical history of HTN, and BPH admitted after mechanical fall this AM (12/27) with increased frequency of falls at home (syncopal and mechanical). Patient then noted to have slow A fib and transferred to 5 uris for further evaluation and monitoring/EP evaluation for pacemaker placement.    87 yo m PMH HTN and BPH admitted after mechanical fall (12/27) with increased frequency of falls at home (syncopal and mechanical). Patient was to be discharged to [facility] 1/7 then noted to have asymptomatic slow A fib and transferred to [facility] for further evaluation and monitoring/EP evaluation for pacemaker placement/cardioversion.

## 2020-01-07 NOTE — PROGRESS NOTE ADULT - NSHPATTENDINGPLANDISCUSS_GEN_ALL_CORE
resident team and patient.
HS, patient
resident team and patient.

## 2020-01-07 NOTE — PROGRESS NOTE ADULT - PROBLEM SELECTOR PLAN 1
- Patient in new A fib with slow ventricular response with HR down to 39 overnight  - sx:  - EP consulted, aware, to see patient in AM to assess for need for pacemaker/possible cardioversion  - TTE ordered, patient NPO after midnight in case PORTILLO needed, will f/u EP recs in am  - No AV kristopher agents, started heparin GGT - Patient in new A fib with slow ventricular response with HR down to 39 overnight  - sx:  - EP consulted, aware, to see patient in AM to assess for need for pacemaker/possible cardioversion  - TTE ordered, patient NPO after midnight in case PORTILLO needed, will f/u EP recs in am  - No AV kristopher agents, started heparin GGT, watch for pauses - Patient in new A fib with slow ventricular response with HR down to 39 overnight  - asymptomatic  - EP consulted, aware, to see patient in AM to assess for need for pacemaker/possible cardioversion  - TTE ordered, patient NPO after midnight in case PORTILLO needed, will f/u EP recs in am  - No AV kristopher agents, started heparin GGT, watch for pauses

## 2020-01-08 LAB
ALBUMIN SERPL ELPH-MCNC: 3.4 G/DL — SIGNIFICANT CHANGE UP (ref 3.3–5)
ALP SERPL-CCNC: 89 U/L — SIGNIFICANT CHANGE UP (ref 40–120)
ALT FLD-CCNC: 27 U/L — SIGNIFICANT CHANGE UP (ref 10–45)
ANION GAP SERPL CALC-SCNC: 12 MMOL/L — SIGNIFICANT CHANGE UP (ref 5–17)
APTT BLD: 79 SEC — HIGH (ref 27.5–36.3)
AST SERPL-CCNC: 19 U/L — SIGNIFICANT CHANGE UP (ref 10–40)
BASOPHILS # BLD AUTO: 0.04 K/UL — SIGNIFICANT CHANGE UP (ref 0–0.2)
BASOPHILS NFR BLD AUTO: 0.9 % — SIGNIFICANT CHANGE UP (ref 0–2)
BILIRUB SERPL-MCNC: 0.3 MG/DL — SIGNIFICANT CHANGE UP (ref 0.2–1.2)
BUN SERPL-MCNC: 34 MG/DL — HIGH (ref 7–23)
CALCIUM SERPL-MCNC: 8.4 MG/DL — SIGNIFICANT CHANGE UP (ref 8.4–10.5)
CHLORIDE SERPL-SCNC: 106 MMOL/L — SIGNIFICANT CHANGE UP (ref 96–108)
CO2 SERPL-SCNC: 24 MMOL/L — SIGNIFICANT CHANGE UP (ref 22–31)
CREAT SERPL-MCNC: 1.5 MG/DL — HIGH (ref 0.5–1.3)
EOSINOPHIL # BLD AUTO: 0.19 K/UL — SIGNIFICANT CHANGE UP (ref 0–0.5)
EOSINOPHIL NFR BLD AUTO: 4.5 % — SIGNIFICANT CHANGE UP (ref 0–6)
GLUCOSE SERPL-MCNC: 112 MG/DL — HIGH (ref 70–99)
HCT VFR BLD CALC: 38.4 % — LOW (ref 39–50)
HGB BLD-MCNC: 12.8 G/DL — LOW (ref 13–17)
IMM GRANULOCYTES NFR BLD AUTO: 0.7 % — SIGNIFICANT CHANGE UP (ref 0–1.5)
LYMPHOCYTES # BLD AUTO: 1.34 K/UL — SIGNIFICANT CHANGE UP (ref 1–3.3)
LYMPHOCYTES # BLD AUTO: 31.5 % — SIGNIFICANT CHANGE UP (ref 13–44)
MAGNESIUM SERPL-MCNC: 2.1 MG/DL — SIGNIFICANT CHANGE UP (ref 1.6–2.6)
MCHC RBC-ENTMCNC: 29.8 PG — SIGNIFICANT CHANGE UP (ref 27–34)
MCHC RBC-ENTMCNC: 33.3 GM/DL — SIGNIFICANT CHANGE UP (ref 32–36)
MCV RBC AUTO: 89.5 FL — SIGNIFICANT CHANGE UP (ref 80–100)
MONOCYTES # BLD AUTO: 0.42 K/UL — SIGNIFICANT CHANGE UP (ref 0–0.9)
MONOCYTES NFR BLD AUTO: 9.9 % — SIGNIFICANT CHANGE UP (ref 2–14)
NEUTROPHILS # BLD AUTO: 2.24 K/UL — SIGNIFICANT CHANGE UP (ref 1.8–7.4)
NEUTROPHILS NFR BLD AUTO: 52.5 % — SIGNIFICANT CHANGE UP (ref 43–77)
NRBC # BLD: 0 /100 WBCS — SIGNIFICANT CHANGE UP (ref 0–0)
PLATELET # BLD AUTO: 234 K/UL — SIGNIFICANT CHANGE UP (ref 150–400)
POTASSIUM SERPL-MCNC: 4.3 MMOL/L — SIGNIFICANT CHANGE UP (ref 3.5–5.3)
POTASSIUM SERPL-SCNC: 4.3 MMOL/L — SIGNIFICANT CHANGE UP (ref 3.5–5.3)
PROT SERPL-MCNC: 6 G/DL — SIGNIFICANT CHANGE UP (ref 6–8.3)
RBC # BLD: 4.29 M/UL — SIGNIFICANT CHANGE UP (ref 4.2–5.8)
RBC # FLD: 15.7 % — HIGH (ref 10.3–14.5)
SODIUM SERPL-SCNC: 142 MMOL/L — SIGNIFICANT CHANGE UP (ref 135–145)
WBC # BLD: 4.26 K/UL — SIGNIFICANT CHANGE UP (ref 3.8–10.5)
WBC # FLD AUTO: 4.26 K/UL — SIGNIFICANT CHANGE UP (ref 3.8–10.5)

## 2020-01-08 PROCEDURE — 93306 TTE W/DOPPLER COMPLETE: CPT | Mod: 26

## 2020-01-08 PROCEDURE — 99232 SBSQ HOSP IP/OBS MODERATE 35: CPT

## 2020-01-08 RX ORDER — APIXABAN 2.5 MG/1
2.5 TABLET, FILM COATED ORAL EVERY 12 HOURS
Refills: 0 | Status: DISCONTINUED | OUTPATIENT
Start: 2020-01-08 | End: 2020-01-09

## 2020-01-08 RX ADMIN — Medication 650 MILLIGRAM(S): at 05:42

## 2020-01-08 RX ADMIN — Medication 1 DROP(S): at 05:14

## 2020-01-08 RX ADMIN — APIXABAN 2.5 MILLIGRAM(S): 2.5 TABLET, FILM COATED ORAL at 22:15

## 2020-01-08 RX ADMIN — AMLODIPINE BESYLATE 10 MILLIGRAM(S): 2.5 TABLET ORAL at 05:11

## 2020-01-08 RX ADMIN — Medication 1 TABLET(S): at 14:11

## 2020-01-08 RX ADMIN — Medication 1 DROP(S): at 14:11

## 2020-01-08 RX ADMIN — HEPARIN SODIUM 1200 UNIT(S)/HR: 5000 INJECTION INTRAVENOUS; SUBCUTANEOUS at 07:43

## 2020-01-08 RX ADMIN — TAMSULOSIN HYDROCHLORIDE 0.4 MILLIGRAM(S): 0.4 CAPSULE ORAL at 21:37

## 2020-01-08 RX ADMIN — CARBAMIDE PEROXIDE 5 DROP(S): 81.86 SOLUTION/ DROPS AURICULAR (OTIC) at 05:15

## 2020-01-08 RX ADMIN — CARBAMIDE PEROXIDE 5 DROP(S): 81.86 SOLUTION/ DROPS AURICULAR (OTIC) at 17:40

## 2020-01-08 RX ADMIN — Medication 81 MILLIGRAM(S): at 21:37

## 2020-01-08 RX ADMIN — Medication 650 MILLIGRAM(S): at 05:11

## 2020-01-08 RX ADMIN — Medication 1 DROP(S): at 21:37

## 2020-01-08 NOTE — CONSULT NOTE ADULT - ASSESSMENT
89 yo M  with history of HTN, OA, multiple UTI's 2/2 to BPH who was admitted for evaluation after a mechanical fall as well as history of multiple falls and syncopal episodes was transferred to telemetry for monitoring after he was noted to be in atrial fibrillation with HR in 40s.  Patient self converted to sinus rhythm without pauses, his HR is in 60 to 70 BPM in sinus rhythm, and patient is asymptomatic. Continue anticoagulation, may consider out patient cardiac monitoring.

## 2020-01-08 NOTE — PROGRESS NOTE ADULT - PROBLEM SELECTOR PLAN 3
Patient with repeated falls as an outpatient, fall on this admission appears mechanical in nature.   Ddx: orthostasis vs mechanical falls due to OA vs mechanical fall due to alcohol misuse (denies prior hospitalization for withdrawal) vs arrhythmia  - Orthostasis resolved with 500 cc IV fluids  - PT recs for MONIKA for deconditioning

## 2020-01-08 NOTE — CONSULT NOTE ADULT - SUBJECTIVE AND OBJECTIVE BOX
Electrophysiology Consult Note:     CHIEF COMPLAINT:  Patient is a 88y old  Male who presents with a chief complaint of Fall (08 Jan 2020 12:12)      HISTORY OF PRESENT ILLNESS:   HPI:   87 yo M  with history of HTN, OA, multiple UTI's 2/2 to BPH who was admitted for evaluation after a mechanical fall as well as history of multiple falls and syncopal episodes.  His hospital stay was remarkable for RLE swelling which was secondary to thrombus within the right intramuscular calf vein with not requiring AC. PT evaluation was done and patient was scheduled to be transferred to [facility]. On 1/7, pt was bradycardic to 39 BPM in atrial fibrillation. He was transferred to telemetry for observation. Patient denies any chest pain, SOB, palpitation, dizziness.   EPS called to evaluate.         PAST MEDICAL & SURGICAL HISTORY:  History of BPH  Hypertension, unspecified type  H/O right knee surgery: 15 yrs ago      FAMILY HISTORY:  No pertinent family history in first degree relatives      SOCIAL HISTORY:    [ x] Non-smoker      Allergies    No Known Allergies    Intolerances    	    MEDICATIONS:  MEDICATIONS  (STANDING):  amLODIPine   Tablet 10 milliGRAM(s) Oral daily  apixaban 2.5 milliGRAM(s) Oral every 12 hours  artificial  tears Solution 1 Drop(s) Both EYES three times a day  aspirin enteric coated 81 milliGRAM(s) Oral every 24 hours  bisacodyl Suppository 10 milliGRAM(s) Rectal daily  carbamide peroxide Otic Solution 5 Drop(s) Both Ears two times a day  influenza  Vaccine (HIGH DOSE) 0.5 milliLiter(s) IntraMuscular once  multivitamin 1 Tablet(s) Oral daily  polyethylene glycol 3350 17 Gram(s) Oral every 12 hours  senna 2 Tablet(s) Oral at bedtime  tamsulosin 0.4 milliGRAM(s) Oral at bedtime    MEDICATIONS  (PRN):  acetaminophen   Tablet .. 650 milliGRAM(s) Oral every 6 hours PRN Mild Pain (1 - 3), Moderate Pain (4 - 6)        REVIEW OF SYSTEMS:  CONSTITUTIONAL: No fever, weight loss, or fatigue  EYES: No eye pain, visual disturbances, or discharge  ENMT:  No difficulty hearing, tinnitus, vertigo; No sinus or throat pain  NECK: No pain or stiffness  RESPIRATORY: No cough, wheezing, chills or hemoptysis; No Shortness of Breath  CARDIOVASCULAR: No chest pain, palpitations, dizziness, or leg swelling  GASTROINTESTINAL: No abdominal or epigastric pain. No nausea, vomiting, or hematemesis; No diarrhea or constipation.   GENITOURINARY: No dysuria, frequency, hematuria, or incontinence  NEUROLOGICAL: No headaches, memory loss, loss of strength, numbness, or tremors  SKIN: No itching, burning, rashes, or lesions   LYMPH Nodes: No enlarged glands  ENDOCRINE: No heat or cold intolerance; No hair loss  MUSCULOSKELETAL: No joint pain or swelling; No muscle, back, or extremity pain      PHYSICAL EXAM:  Vital Signs Last 24 Hrs  T(C): 36.4 (08 Jan 2020 13:52), Max: 36.9 (08 Jan 2020 07:14)  T(F): 97.5 (08 Jan 2020 13:52), Max: 98.4 (08 Jan 2020 07:14)  HR: 59 (08 Jan 2020 09:00) (43 - 69)  BP: 137/63 (08 Jan 2020 09:00) (95/55 - 137/63)  BP(mean): --  RR: 19 (08 Jan 2020 09:00) (14 - 19)  SpO2: 64% (08 Jan 2020 05:08) (64% - 100%)  Daily     Daily     Constitutional: NAD	  HEENT:    PERRL, EOMI	  CVS: l S1 S2, No JVD,  No edema  Pulm: Lungs clear to auscultation	  GI:  Soft, Non-tender, + BS	  Ext: No LE edema  Vascular: Peripheral pulses palpable   Neurologic: A&O x 3  Skin: No rash or lesion       	  LABS:	                         12.8   4.26  )-----------( 234      ( 08 Jan 2020 05:32 )             38.4     01-08    142  |  106  |  34<H>  ----------------------------<  112<H>  4.3   |  24  |  1.50<H>    Ca    8.4      08 Jan 2020 05:32  Mg     2.1     01-08    TPro  6.0  /  Alb  3.4  /  TBili  0.3  /  DBili  x   /  AST  19  /  ALT  27  /  AlkPhos  89  01-08    proBNP:   Lipid Profile:   HgA1c:   TSH: Thyroid Stimulating Hormone, Serum: 2.072 uIU/mL (01-07 @ 19:25)  	      EKG: < from: 12 Lead ECG (01.07.20 @ 09:56) >  Ventricular Rate 39 BPM    Atrial Rate 27 BPM    QRS Duration 90 ms    Q-T Interval 458 ms    QTC Calculation(Bezet) 368 ms    R Axis -9 degrees    T Axis 26 degrees    Diagnosis Line Atrial fibrillation with slow ventricular response  Nonspecific T wave abnormality        Telemetry: sinus rhythm     Echo: < from: Echocardiogram (01.08.20 @ 10:51) >  1. The left ventricle cavity size is normal in size. There is moderate asymmetric left ventricular hypertrophy. Left ventricular ejection fraction is 70%. There is normal left ventricular diastolic function and filling pressure.   2. Normal right ventricular size and systolic function.   3. No significant valvular disease.   4. No evidence of pulmonary hypertension, pulmonary artery systolic pressure is 26.00 mmHg.   5. No pericardial effusion.

## 2020-01-08 NOTE — PROGRESS NOTE ADULT - SUBJECTIVE AND OBJECTIVE BOX
OVERNIGHT EVENTS: Patient went in and out of slow Afib.     SUBJECTIVE / INTERVAL HPI: Patient seen and examined at bedside. Not answering many questions. He was upset about being in the hospital. Unable to elicit ROS.     VITAL SIGNS:  Vital Signs Last 24 Hrs  T(C): 36.3 (08 Jan 2020 11:59), Max: 36.9 (08 Jan 2020 07:14)  T(F): 97.3 (08 Jan 2020 11:59), Max: 98.4 (08 Jan 2020 07:14)  HR: 59 (08 Jan 2020 09:00) (43 - 69)  BP: 137/63 (08 Jan 2020 09:00) (95/55 - 137/63)  BP(mean): --  RR: 19 (08 Jan 2020 09:00) (14 - 19)  SpO2: 64% (08 Jan 2020 05:08) (64% - 100%)    01-07-20 @ 07:01  -  01-08-20 @ 07:00  --------------------------------------------------------  IN: 96 mL / OUT: 500 mL / NET: -404 mL        PHYSICAL EXAM:    Appearance: Normal  HEENT: EOMi, NCAT  Neck: Supple, no JVD  Cardiovascular: Normal S1 S2, normal rhythm, bradycardic, no murmurs appreciated  Respiratory: Lungs clear to auscultation bilaterally  Gastrointestinal: Soft, non-tender, + BS  Skin: No rashes, ecchymoses, or cyanosis.  Extremities: Normal range of motion, no clubbing, cyanosis. No peripheral edema is present. Tender to palpation over R distal calf   Neurologic: Non-focal  Psychiatry: Flat affect, tearful on exam     MEDICATIONS:  MEDICATIONS  (STANDING):  amLODIPine   Tablet 10 milliGRAM(s) Oral daily  apixaban 2.5 milliGRAM(s) Oral every 12 hours  artificial  tears Solution 1 Drop(s) Both EYES three times a day  aspirin enteric coated 81 milliGRAM(s) Oral every 24 hours  bisacodyl Suppository 10 milliGRAM(s) Rectal daily  carbamide peroxide Otic Solution 5 Drop(s) Both Ears two times a day  influenza  Vaccine (HIGH DOSE) 0.5 milliLiter(s) IntraMuscular once  multivitamin 1 Tablet(s) Oral daily  polyethylene glycol 3350 17 Gram(s) Oral every 12 hours  senna 2 Tablet(s) Oral at bedtime  tamsulosin 0.4 milliGRAM(s) Oral at bedtime    MEDICATIONS  (PRN):  acetaminophen   Tablet .. 650 milliGRAM(s) Oral every 6 hours PRN Mild Pain (1 - 3), Moderate Pain (4 - 6)      ALLERGIES:  Allergies    No Known Allergies    Intolerances        LABS:                        12.8   4.26  )-----------( 234      ( 08 Jan 2020 05:32 )             38.4     01-08    142  |  106  |  34<H>  ----------------------------<  112<H>  4.3   |  24  |  1.50<H>    Ca    8.4      08 Jan 2020 05:32  Mg     2.1     01-08    TPro  6.0  /  Alb  3.4  /  TBili  0.3  /  DBili  x   /  AST  19  /  ALT  27  /  AlkPhos  89  01-08    PT/INR - ( 07 Jan 2020 19:25 )   PT: 11.1 sec;   INR: 0.97          PTT - ( 08 Jan 2020 05:33 )  PTT:79.0 sec

## 2020-01-08 NOTE — PROGRESS NOTE ADULT - PROBLEM SELECTOR PLAN 2
Doppler with superficial R calf DVT below popliteal vein 12/29  - Medicine team was initially holding AC since it is a distal clot but now that the patient needs AC for Afib, will start Eliquis 2.5mg BID and go up on the dose to 5mg BID once CR< 1.5

## 2020-01-08 NOTE — PROGRESS NOTE ADULT - PROBLEM SELECTOR PLAN 8
Patient occasionally complaining of ankle pain during admission   -x-ray not showing fractures  - PRN tylenol    DVT PPX: Eliquis BID   Dispo: [facility], was approved on 1/7 before needing transfer to [facility]

## 2020-01-08 NOTE — PROGRESS NOTE ADULT - PROBLEM SELECTOR PLAN 1
Patient in new A fib with slow ventricular response with HR down to 39 on 1/7. His Afib is paroxysmal. He is currently sinus bradycardic w/ 1st degree AV block. He was transferred to [facility] for tele monitoring for possible sinus pauses.  - Tele was reviewed. No sinus pauses overnight.   - EP is aware and following the patient for possible pacemaker. No plans for cardioversion as patient is currently in sinus rhythm   - He was started on hep gtt for new onset pAfib. Will trasnition to PO Eliquis 2.5mg BID tonight ( can change it to 5mg BID once Cr <1.5)

## 2020-01-08 NOTE — PROGRESS NOTE ADULT - ASSESSMENT
89 yo m PMH HTN and BPH admitted after mechanical fall (12/27) with increased frequency of falls at home (syncopal and mechanical). Patient was to be discharged to [facility] 1/7 then noted to have asymptomatic slow A fib and transferred to [facility] for further evaluation and monitoring/EP evaluation for pacemaker placement,

## 2020-01-09 PROBLEM — I10 ESSENTIAL (PRIMARY) HYPERTENSION: Chronic | Status: ACTIVE | Noted: 2019-12-27

## 2020-01-09 PROBLEM — Z87.438 PERSONAL HISTORY OF OTHER DISEASES OF MALE GENITAL ORGANS: Chronic | Status: ACTIVE | Noted: 2019-12-27

## 2020-01-09 LAB
ANION GAP SERPL CALC-SCNC: 10 MMOL/L — SIGNIFICANT CHANGE UP (ref 5–17)
BUN SERPL-MCNC: 27 MG/DL — HIGH (ref 7–23)
CALCIUM SERPL-MCNC: 8.8 MG/DL — SIGNIFICANT CHANGE UP (ref 8.4–10.5)
CHLORIDE SERPL-SCNC: 106 MMOL/L — SIGNIFICANT CHANGE UP (ref 96–108)
CO2 SERPL-SCNC: 24 MMOL/L — SIGNIFICANT CHANGE UP (ref 22–31)
CREAT SERPL-MCNC: 1.31 MG/DL — HIGH (ref 0.5–1.3)
GLUCOSE SERPL-MCNC: 102 MG/DL — HIGH (ref 70–99)
HCT VFR BLD CALC: 38.5 % — LOW (ref 39–50)
HGB BLD-MCNC: 13 G/DL — SIGNIFICANT CHANGE UP (ref 13–17)
MAGNESIUM SERPL-MCNC: 2.1 MG/DL — SIGNIFICANT CHANGE UP (ref 1.6–2.6)
MCHC RBC-ENTMCNC: 30.1 PG — SIGNIFICANT CHANGE UP (ref 27–34)
MCHC RBC-ENTMCNC: 33.8 GM/DL — SIGNIFICANT CHANGE UP (ref 32–36)
MCV RBC AUTO: 89.1 FL — SIGNIFICANT CHANGE UP (ref 80–100)
NRBC # BLD: 0 /100 WBCS — SIGNIFICANT CHANGE UP (ref 0–0)
PLATELET # BLD AUTO: 224 K/UL — SIGNIFICANT CHANGE UP (ref 150–400)
POTASSIUM SERPL-MCNC: 4.1 MMOL/L — SIGNIFICANT CHANGE UP (ref 3.5–5.3)
POTASSIUM SERPL-SCNC: 4.1 MMOL/L — SIGNIFICANT CHANGE UP (ref 3.5–5.3)
RBC # BLD: 4.32 M/UL — SIGNIFICANT CHANGE UP (ref 4.2–5.8)
RBC # FLD: 15.5 % — HIGH (ref 10.3–14.5)
SODIUM SERPL-SCNC: 140 MMOL/L — SIGNIFICANT CHANGE UP (ref 135–145)
WBC # BLD: 3.76 K/UL — LOW (ref 3.8–10.5)
WBC # FLD AUTO: 3.76 K/UL — LOW (ref 3.8–10.5)

## 2020-01-09 PROCEDURE — 99232 SBSQ HOSP IP/OBS MODERATE 35: CPT

## 2020-01-09 RX ORDER — AMLODIPINE BESYLATE 2.5 MG/1
1 TABLET ORAL
Qty: 30 | Refills: 3
Start: 2020-01-09 | End: 2020-05-07

## 2020-01-09 RX ORDER — GABAPENTIN 400 MG/1
1 CAPSULE ORAL
Qty: 0 | Refills: 0 | DISCHARGE

## 2020-01-09 RX ORDER — APIXABAN 2.5 MG/1
1 TABLET, FILM COATED ORAL
Qty: 60 | Refills: 3
Start: 2020-01-09 | End: 2020-05-07

## 2020-01-09 RX ORDER — APIXABAN 2.5 MG/1
5 TABLET, FILM COATED ORAL
Refills: 0 | Status: DISCONTINUED | OUTPATIENT
Start: 2020-01-09 | End: 2020-01-10

## 2020-01-09 RX ORDER — CARBAMIDE PEROXIDE 81.86 MG/ML
5 SOLUTION/ DROPS AURICULAR (OTIC)
Qty: 1 | Refills: 0
Start: 2020-01-09 | End: 2020-02-07

## 2020-01-09 RX ADMIN — Medication 1 DROP(S): at 15:58

## 2020-01-09 RX ADMIN — APIXABAN 2.5 MILLIGRAM(S): 2.5 TABLET, FILM COATED ORAL at 11:01

## 2020-01-09 RX ADMIN — Medication 650 MILLIGRAM(S): at 21:54

## 2020-01-09 RX ADMIN — Medication 81 MILLIGRAM(S): at 21:54

## 2020-01-09 RX ADMIN — APIXABAN 5 MILLIGRAM(S): 2.5 TABLET, FILM COATED ORAL at 17:56

## 2020-01-09 RX ADMIN — Medication 1 DROP(S): at 21:54

## 2020-01-09 RX ADMIN — Medication 650 MILLIGRAM(S): at 22:54

## 2020-01-09 RX ADMIN — CARBAMIDE PEROXIDE 5 DROP(S): 81.86 SOLUTION/ DROPS AURICULAR (OTIC) at 06:04

## 2020-01-09 RX ADMIN — Medication 1 TABLET(S): at 12:16

## 2020-01-09 RX ADMIN — AMLODIPINE BESYLATE 10 MILLIGRAM(S): 2.5 TABLET ORAL at 06:04

## 2020-01-09 RX ADMIN — Medication 1 DROP(S): at 06:03

## 2020-01-09 RX ADMIN — TAMSULOSIN HYDROCHLORIDE 0.4 MILLIGRAM(S): 0.4 CAPSULE ORAL at 21:54

## 2020-01-09 RX ADMIN — CARBAMIDE PEROXIDE 5 DROP(S): 81.86 SOLUTION/ DROPS AURICULAR (OTIC) at 17:59

## 2020-01-09 NOTE — PROGRESS NOTE ADULT - ASSESSMENT
87 yo m PMH HTN and BPH admitted after mechanical fall (12/27) with increased frequency of falls at home (syncopal and mechanical). Patient was to be discharged to [facility] 1/7 then noted to have asymptomatic slow A fib and transferred to [facility] for further evaluation and monitoring/EP evaluation for possible pacemaker placement but EP deemed pt not to need PPM at this time. Pt is stable for discharge and awaiting bed availability at [facility].

## 2020-01-09 NOTE — PROGRESS NOTE ADULT - PROBLEM SELECTOR PLAN 4
Resolved
Patient with hypertension non-compliant with amlodipine and atenolol  - increased amlodipine to 10 mg  - Atenolol d/c'd due to 1st degree AV block, cont to hold in the setting of bradycardia  - continue to monitor
Patient with hypertension non-compliant with amlodipine and atenolol  - increased amlodipine to 10 mg  - Atenolol d/c'd due to 1st degree AV block, cont to hold in the setting of bradycardia  - continue to monitor
Resolved
Patient with hypertension non-compliant with amlodipine and atenolol  - increased amlodipine to 10 mg daily  - Atenolol d/c'd due to 1st degree AV block, cont to hold in the setting of bradycardia  - continue to monitor
Resolved

## 2020-01-09 NOTE — PROGRESS NOTE ADULT - PROBLEM SELECTOR PROBLEM 6
Hypertension, unspecified type
Constipation
Constipation
Hypertension, unspecified type
Constipation
Hypertension, unspecified type

## 2020-01-09 NOTE — PROGRESS NOTE ADULT - PROBLEM SELECTOR PROBLEM 1
Atrial fibrillation with slow ventricular response
Atrial fibrillation with slow ventricular response
Falls frequently
Atrial fibrillation with slow ventricular response
Falls frequently

## 2020-01-09 NOTE — PROGRESS NOTE ADULT - PROBLEM SELECTOR PLAN 2
Doppler with superficial R calf DVT below popliteal vein 12/29  - Medicine team was initially holding AC since it is a distal clot but now that the patient needs AC for Afib,  -Continue Eliquis 5mg BID.

## 2020-01-09 NOTE — PROGRESS NOTE ADULT - PROBLEM SELECTOR PLAN 8
Patient occasionally complaining of ankle pain during admission   -x-ray not showing fractures  - PRN tylenol    DVT PPX: Eliquis BID   Dispo: [facility], was approved on 1/7 before needing transfer to [facility], pt now set for discharge on 1/9 and awaiting [facility] bed availability

## 2020-01-09 NOTE — PROGRESS NOTE ADULT - PROBLEM SELECTOR PLAN 5
Resolved. Patient with constipation for 5-6 days distended on exam  - senna 2 tabs   - miralax daily  -2 BM s/p disimpaction
Patient with constipation for 5-6 days distended on exam  - senna 2 tabs   - miralax daily  -Small BM s/p tap water enema
Patient with constipation for 5-6 days distended on exam  - senna 2 tabs   - miralax daily  -Small BM s/p tap water enema
Patient with constipation for 5-6 days distended on exam  - senna 2 tabs   - miralax daily  -Small BM s/p tap water enema  -2 BM s/p disimpaction
Patient with elevated troponin .03 -> trended to .04. EKG x 2 w/o ischemic changes.  -troponin peaked at .04
Patient with elevated troponin .03 -> trended to .04. EKG x 2 w/o ischemic changes.  -troponin peaked at .04
Resolved. Patient with constipation for 5-6 days distended on exam  - senna 2 tabs   - miralax daily  -2 BM s/p disimpaction
Resolved. Patient with constipation for 5-6 days distended on exam  - senna 2 tabs   - miralax daily  -2 BM s/p disimpaction
Patient with elevated troponin 0.03 -> trended to 0.04. EKG x 2 w/o ischemic changes.  -troponin peaked at 0.04
Resolved. Patient with constipation for 5-6 days distended on exam  - senna 2 tabs   - miralax daily  -2 BM s/p disimpaction

## 2020-01-09 NOTE — PROGRESS NOTE ADULT - PROBLEM SELECTOR PROBLEM 7
History of BPH

## 2020-01-09 NOTE — PROGRESS NOTE ADULT - PROBLEM SELECTOR PROBLEM 2
Orthostatic hypotension
Deep vein thrombosis (DVT) of calf muscle vein, unspecified chronicity, unspecified laterality
Deep vein thrombosis (DVT) of calf muscle vein, unspecified chronicity, unspecified laterality
Orthostatic hypotension
Deep vein thrombosis (DVT) of calf muscle vein, unspecified chronicity, unspecified laterality
Orthostatic hypotension

## 2020-01-09 NOTE — PROGRESS NOTE ADULT - PROBLEM SELECTOR PROBLEM 5
Constipation
Elevated troponin
Elevated troponin
Constipation
Elevated troponin
Constipation

## 2020-01-09 NOTE — PROGRESS NOTE ADULT - ASSESSMENT
per Cardiology    89 yo m PMH HTN and BPH admitted after mechanical fall (12/27) with increased frequency of falls at home (syncopal and mechanical). Patient was to be discharged to [facility] 1/7 then noted to have asymptomatic slow A fib and transferred to [facility] for further evaluation and monitoring/EP evaluation for pacemaker placement,

## 2020-01-09 NOTE — PROGRESS NOTE ADULT - SUBJECTIVE AND OBJECTIVE BOX
Physical Medicine and Rehabilitation Progress Note:    Patient is a 88y old  Male who presents with a chief complaint of Fall (2020 13:59)      HPI:  Patient is a 88 year old man with past medical history of HTN, OA, multiple UTI's 2/2 to BPH who presents for fall this AM. Patient reports this AM he was reaching for the newspaper when his knees buckled. He called his neighbor for help. The neighbor helped him up and EMS was called. Patient denied any head trauma with fall. Patient denies any LOC or pre-syncopal symptoms. Patient states last fall prior to that was 4 days ago cannot remember the events surrounding that fall whether it was mechanical or syncopal. Patient states he has had multiple falls over the past couple of years, some syncopal some mechanical like this mornings fall.   PMHx: HTN, OA, UTI 2/2 to BPH   Allergies: none   PSHx: 15 yrs ago right knee surgery   FHx: parents  of old age, unclear family hx patient w/o siblings or children  SHx: never smoker, drinks 1-2 shots of whisky a day, no drug use. 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 Does not use assistive devices to walk.   ED Course: T 98.2, HR 67, /83, S 97% on RA. Patient labs significant for hypokalemia to 3.2. Troponin .03 with  w/o chest pain. UA negative. CT head negative for intracranial bleed. EKG sinus with 1st degree AV block otherwise w/o ischemic changes. PT eval [facility]. Patient admitted for [facility] placement. (27 Dec 2019 18:10)                            13.0   3.76  )-----------( 224      ( 2020 06:00 )             38.5           140  |  106  |  27<H>  ----------------------------<  102<H>  4.1   |  24  |  1.31<H>    Ca    8.8      2020 06:00  Mg     2.1     -    TPro  6.0  /  Alb  3.4  /  TBili  0.3  /  DBili  x   /  AST  19  /  ALT  27  /  AlkPhos  89      Vital Signs Last 24 Hrs  T(C): 36.4 (2020 14:24), Max: 36.7 (2020 09:17)  T(F): 97.6 (2020 14:24), Max: 98.1 (2020 09:17)  HR: 68 (2020 15:57) (57 - 74)  BP: 143/64 (2020 15:57) (113/55 - 158/71)  BP(mean): 92 (2020 15:57) (79 - 92)  RR: 16 (2020 15:57) (16 - 19)  SpO2: 98% (2020 15:57) (97% - 99%)    MEDICATIONS  (STANDING):  amLODIPine   Tablet 10 milliGRAM(s) Oral daily  apixaban 5 milliGRAM(s) Oral two times a day  artificial  tears Solution 1 Drop(s) Both EYES three times a day  aspirin enteric coated 81 milliGRAM(s) Oral every 24 hours  bisacodyl Suppository 10 milliGRAM(s) Rectal daily  carbamide peroxide Otic Solution 5 Drop(s) Both Ears two times a day  influenza  Vaccine (HIGH DOSE) 0.5 milliLiter(s) IntraMuscular once  multivitamin 1 Tablet(s) Oral daily  polyethylene glycol 3350 17 Gram(s) Oral every 12 hours  senna 2 Tablet(s) Oral at bedtime  tamsulosin 0.4 milliGRAM(s) Oral at bedtime    MEDICATIONS  (PRN):  acetaminophen   Tablet .. 650 milliGRAM(s) Oral every 6 hours PRN Mild Pain (1 - 3), Moderate Pain (4 - 6)    Currently Undergoing Physical Therapy at bedside.    Functional Status Assessment:      Therapeutic Interventions      Bed Mobility  Bed Mobility Training Sit-to-Supine: supervision;  bed rails  Bed Mobility Training Limitations: decreased strength    Sit-Stand Transfer Training  Transfer Training Sit-to-Stand Transfer: minimum assist (75% patient effort);  1 person assist;  verbal cues;  weight-bearing as tolerated   rolling walker  Transfer Training Stand-to-Sit Transfer: moderate assist (50% patient effort);  1 person assist;  verbal cues;  weight-bearing as tolerated   rolling walker  Sit-to-Stand Transfer Training Transfer Safety Analysis: decreased strength    Gait Training  Gait Training: minimum assist (75% patient effort);  1 person assist;  verbal cues;  weight-bearing as tolerated   rolling walker;  75 feet  Gait Analysis: swing-through gait   decreased mary jane;  decreased step length;  decreased strength;  impaired endurance;  75 feet;  rolling walker          PM&R Impression: as above    Current Disposition Plan Recommendations: subacute rehab placement

## 2020-01-09 NOTE — PROGRESS NOTE ADULT - SUBJECTIVE AND OBJECTIVE BOX
Interventional Cardiology PA Adult Progress Note    CC: weakness, unsteady gait and fall  Subjective Assessment:    Weakness improving, anxious to go to [facility].    ROS otherwise negative except as stated in HPI and subjective	  MEDICATIONS:  amLODIPine   Tablet 10 milliGRAM(s) Oral daily  tamsulosin 0.4 milliGRAM(s) Oral at bedtime  acetaminophen   Tablet .. 650 milliGRAM(s) Oral every 6 hours PRN  bisacodyl Suppository 10 milliGRAM(s) Rectal daily  polyethylene glycol 3350 17 Gram(s) Oral every 12 hours  senna 2 Tablet(s) Oral at bedtime  apixaban 5 milliGRAM(s) Oral two times a day  artificial  tears Solution 1 Drop(s) Both EYES three times a day  aspirin enteric coated 81 milliGRAM(s) Oral every 24 hours  carbamide peroxide Otic Solution 5 Drop(s) Both Ears two times a day  influenza  Vaccine (HIGH DOSE) 0.5 milliLiter(s) IntraMuscular once  multivitamin 1 Tablet(s) Oral daily    [PHYSICAL EXAM:  TELEMETRY: SB to 50s  T(C): 36.4 (01-09-20 @ 14:24), Max: 36.7 (01-09-20 @ 09:17)  HR: 68 (01-09-20 @ 15:57) (57 - 74)  BP: 143/64 (01-09-20 @ 15:57) (113/55 - 158/71)  RR: 16 (01-09-20 @ 15:57) (16 - 19)  SpO2: 98% (01-09-20 @ 15:57) (97% - 99%)  Wt(kg): --  I&O's Summary    08 Jan 2020 07:01  -  09 Jan 2020 07:00  --------------------------------------------------------  IN: 120 mL / OUT: 500 mL / NET: -380 mL    09 Jan 2020 07:01  -  09 Jan 2020 17:05  --------------------------------------------------------  IN: 180 mL / OUT: 400 mL / NET: -220 mL      Ernst: none  Central/PICC/Mid Line:  none                                     Appearance: Normal	  HEENT:   Normal oral mucosa, PERRL, EOMI	  Neck: Supple, - JVD  Cardiovascular: Normal S1 S2, No JVD, No murmurs,   Respiratory: Lungs clear to auscultation b/l, No Rales, Rhonchi, Wheezing	  Gastrointestinal:  Soft, Non-tender, + BS	  Skin: No rashes, No ecchymoses, No cyanosis  Extremities: Normal range of motion, No clubbing, cyanosis or edema  Vascular: Peripheral pulses palpable 2+ bilaterally  Neurologic: Non-focal  Psychiatry: A & O x 3, Mood & affect appropriate    LABS:                        13.0   3.76  )-----------( 224      ( 09 Jan 2020 06:00 )             38.5     01-09    140  |  106  |  27<H>  ----------------------------<  102<H>  4.1   |  24  |  1.31<H>    Ca    8.8      09 Jan 2020 06:00  Mg     2.1     01-09    TPro  6.0  /  Alb  3.4  /  TBili  0.3  /  DBili  x   /  AST  19  /  ALT  27  /  AlkPhos  89  01-08  PT/INR - ( 07 Jan 2020 19:25 )   PT: 11.1 sec;   INR: 0.97     PTT - ( 08 Jan 2020 05:33 )  PTT:79.0 sec    ASSESSMENT/PLAN:

## 2020-01-09 NOTE — PROGRESS NOTE ADULT - PROVIDER SPECIALTY LIST ADULT
Cardiology
Cardiology
Internal Medicine
Intervent Cardiology
Rehab Medicine
Internal Medicine

## 2020-01-09 NOTE — PROGRESS NOTE ADULT - PROBLEM SELECTOR PROBLEM 4
Hypokalemia
Hypertension, unspecified type
Hypertension, unspecified type
Hypokalemia
Hypertension, unspecified type
Hypokalemia

## 2020-01-09 NOTE — PROGRESS NOTE ADULT - PROBLEM SELECTOR PLAN 3
Patient with repeated falls as an outpatient, fall on this admission appears mechanical in nature.   Ddx: orthostasis vs mechanical falls due to OA vs mechanical fall due to alcohol misuse (denies prior hospitalization for withdrawal) vs arrhythmia  - Orthostasis resolved with 500 cc IV fluids  - PT recs for [facility] for deconditioning.

## 2020-01-09 NOTE — PROGRESS NOTE ADULT - PROBLEM SELECTOR PLAN 1
Patient in new A fib with slow ventricular response with HR down to 39 on 1/7. His Afib is paroxysmal. He is currently sinus bradycardic w/ 1st degree AV block. He was transferred to [facility] for tele monitoring for possible sinus pauses.  - Tele was reviewed. No sinus pauses overnight.   - EP initially consulted for possible pacemaker. Telemetry reviewed and patient deemed not to need a pacemaker at this time. EP recommends patient follows up with general cardiologist.    - He was started on hep gtt for new onset pAfib, was transitioned to Eliquis 2.5mg PO BID 1/8/2020 PM, and transitioned to Eliquis 5mg BID as Cr 1.3 on 1/9/2020.

## 2020-01-09 NOTE — PROGRESS NOTE ADULT - PROBLEM SELECTOR PROBLEM 3
Elevated troponin
Falls frequently
Falls frequently
Elevated troponin
Falls frequently
Elevated troponin

## 2020-01-09 NOTE — PROGRESS NOTE ADULT - PROBLEM SELECTOR PROBLEM 8
Prophylactic measure
Ankle pain
Ankle pain
Prophylactic measure
Ankle pain
Prophylactic measure

## 2020-01-09 NOTE — PROGRESS NOTE ADULT - PROBLEM SELECTOR PLAN 6
Patient with hypertension non-compliant with amlodipine and atenolol  - increased amlodipine to 10 mg  - will not start atenolol 2/2 1st degree AV block
Patient with hypertension non-compliant with amlodipine and atenolol  - increased amlodipine to 10 mg  - will hold atenolol
Patient with hypertension non-compliant with amlodipine and atenolol  - increased amlodipine to 10 mg  - will not start atenolol 2/2 1st degree AV block
Patient with hypertension non-compliant with amlodipine and atenolol  - increased amlodipine to 10 mg  - will not start atenolol 2/2 1st degree AV block
Patient with hypertension non-compliant with amlodipine and atenolol  - restarting home amlodipine 5mg
Patient with hypertension non-compliant with amlodipine and atenolol  - restarting home amlodipine 5mg
Resolved. Patient with constipation for 5-6 days  - senna 2 tabs   - miralax daily  -2 BM s/p disimpaction
Resolved. Patient with constipation for 5-6 days  - senna 2 tabs   - miralax daily  -2 BM s/p disimpaction
Patient with hypertension non-compliant with amlodipine and atenolol  - increased amlodipine to 10 mg  - will not start atenolol 2/2 1st degree AV block
Resolved. Patient with constipation for 5-6 days  - senna 2 tabs   - miralax daily  -2 BM s/p disimpaction
Patient with hypertension non-compliant with amlodipine and atenolol  - increased amlodipine to 10 mg  - will not start atenolol 2/2 1st degree AV block

## 2020-01-10 VITALS
HEART RATE: 62 BPM | SYSTOLIC BLOOD PRESSURE: 112 MMHG | DIASTOLIC BLOOD PRESSURE: 59 MMHG | OXYGEN SATURATION: 94 % | RESPIRATION RATE: 16 BRPM

## 2020-01-10 PROBLEM — Z00.00 ENCOUNTER FOR PREVENTIVE HEALTH EXAMINATION: Status: ACTIVE | Noted: 2020-01-10

## 2020-01-10 PROCEDURE — 84484 ASSAY OF TROPONIN QUANT: CPT

## 2020-01-10 PROCEDURE — 93005 ELECTROCARDIOGRAM TRACING: CPT

## 2020-01-10 PROCEDURE — ZZZZZ: CPT

## 2020-01-10 PROCEDURE — 80307 DRUG TEST PRSMV CHEM ANLYZR: CPT

## 2020-01-10 PROCEDURE — 90715 TDAP VACCINE 7 YRS/> IM: CPT

## 2020-01-10 PROCEDURE — 84443 ASSAY THYROID STIM HORMONE: CPT

## 2020-01-10 PROCEDURE — 81003 URINALYSIS AUTO W/O SCOPE: CPT

## 2020-01-10 PROCEDURE — 80076 HEPATIC FUNCTION PANEL: CPT

## 2020-01-10 PROCEDURE — 99285 EMERGENCY DEPT VISIT HI MDM: CPT | Mod: 25

## 2020-01-10 PROCEDURE — 97116 GAIT TRAINING THERAPY: CPT

## 2020-01-10 PROCEDURE — 80048 BASIC METABOLIC PNL TOTAL CA: CPT

## 2020-01-10 PROCEDURE — 71045 X-RAY EXAM CHEST 1 VIEW: CPT

## 2020-01-10 PROCEDURE — 93306 TTE W/DOPPLER COMPLETE: CPT

## 2020-01-10 PROCEDURE — 87086 URINE CULTURE/COLONY COUNT: CPT

## 2020-01-10 PROCEDURE — 83735 ASSAY OF MAGNESIUM: CPT

## 2020-01-10 PROCEDURE — 73610 X-RAY EXAM OF ANKLE: CPT

## 2020-01-10 PROCEDURE — 90471 IMMUNIZATION ADMIN: CPT

## 2020-01-10 PROCEDURE — 85025 COMPLETE CBC W/AUTO DIFF WBC: CPT

## 2020-01-10 PROCEDURE — 73562 X-RAY EXAM OF KNEE 3: CPT

## 2020-01-10 PROCEDURE — 82553 CREATINE MB FRACTION: CPT

## 2020-01-10 PROCEDURE — 97530 THERAPEUTIC ACTIVITIES: CPT

## 2020-01-10 PROCEDURE — 97535 SELF CARE MNGMENT TRAINING: CPT

## 2020-01-10 PROCEDURE — 70450 CT HEAD/BRAIN W/O DYE: CPT

## 2020-01-10 PROCEDURE — 36415 COLL VENOUS BLD VENIPUNCTURE: CPT

## 2020-01-10 PROCEDURE — 85730 THROMBOPLASTIN TIME PARTIAL: CPT

## 2020-01-10 PROCEDURE — 85027 COMPLETE CBC AUTOMATED: CPT

## 2020-01-10 PROCEDURE — 80053 COMPREHEN METABOLIC PANEL: CPT

## 2020-01-10 PROCEDURE — 82550 ASSAY OF CK (CPK): CPT

## 2020-01-10 PROCEDURE — 93970 EXTREMITY STUDY: CPT

## 2020-01-10 PROCEDURE — 73523 X-RAY EXAM HIPS BI 5/> VIEWS: CPT

## 2020-01-10 PROCEDURE — 85610 PROTHROMBIN TIME: CPT

## 2020-01-10 PROCEDURE — 97161 PT EVAL LOW COMPLEX 20 MIN: CPT

## 2020-01-10 RX ORDER — TAMSULOSIN HYDROCHLORIDE 0.4 MG/1
1 CAPSULE ORAL
Qty: 30 | Refills: 3
Start: 2020-01-10 | End: 2020-05-08

## 2020-01-10 RX ORDER — TAMSULOSIN HYDROCHLORIDE 0.4 MG/1
1 CAPSULE ORAL
Qty: 0 | Refills: 0 | DISCHARGE

## 2020-01-10 RX ADMIN — APIXABAN 5 MILLIGRAM(S): 2.5 TABLET, FILM COATED ORAL at 06:11

## 2020-01-10 RX ADMIN — AMLODIPINE BESYLATE 10 MILLIGRAM(S): 2.5 TABLET ORAL at 06:11

## 2020-01-10 RX ADMIN — Medication 1 DROP(S): at 13:51

## 2020-01-10 RX ADMIN — Medication 10 MILLIGRAM(S): at 11:40

## 2020-01-10 RX ADMIN — CARBAMIDE PEROXIDE 5 DROP(S): 81.86 SOLUTION/ DROPS AURICULAR (OTIC) at 06:11

## 2020-01-10 RX ADMIN — Medication 1 TABLET(S): at 11:40

## 2020-01-10 RX ADMIN — Medication 1 DROP(S): at 06:11

## 2020-01-15 ENCOUNTER — APPOINTMENT (OUTPATIENT)
Dept: HEART AND VASCULAR | Facility: CLINIC | Age: 85
End: 2020-01-15

## 2020-01-21 DIAGNOSIS — N18.9 CHRONIC KIDNEY DISEASE, UNSPECIFIED: ICD-10-CM

## 2020-01-21 DIAGNOSIS — N40.0 BENIGN PROSTATIC HYPERPLASIA WITHOUT LOWER URINARY TRACT SYMPTOMS: ICD-10-CM

## 2020-01-21 DIAGNOSIS — I12.9 HYPERTENSIVE CHRONIC KIDNEY DISEASE WITH STAGE 1 THROUGH STAGE 4 CHRONIC KIDNEY DISEASE, OR UNSPECIFIED CHRONIC KIDNEY DISEASE: ICD-10-CM

## 2020-01-21 DIAGNOSIS — K59.00 CONSTIPATION, UNSPECIFIED: ICD-10-CM

## 2020-01-21 DIAGNOSIS — I95.1 ORTHOSTATIC HYPOTENSION: ICD-10-CM

## 2020-01-21 DIAGNOSIS — R29.6 REPEATED FALLS: ICD-10-CM

## 2020-01-21 DIAGNOSIS — M92.61 JUVENILE OSTEOCHONDROSIS OF TARSUS, RIGHT ANKLE: ICD-10-CM
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
## 2020-01-21 DIAGNOSIS — E86.0 DEHYDRATION: ICD-10-CM

## 2020-01-21 DIAGNOSIS — E87.6 HYPOKALEMIA: ICD-10-CM

## 2020-01-21 DIAGNOSIS — Z79.899 OTHER LONG TERM (CURRENT) DRUG THERAPY: ICD-10-CM

## 2020-01-21 DIAGNOSIS — I82.469 ACUTE EMBOLISM AND THROMBOSIS OF UNSPECIFIED CALF MUSCULAR VEIN: ICD-10-CM

## 2020-01-21 DIAGNOSIS — I48.91 UNSPECIFIED ATRIAL FIBRILLATION: ICD-10-CM

## 2020-01-21 DIAGNOSIS — M92.62 JUVENILE OSTEOCHONDROSIS OF TARSUS, LEFT ANKLE: ICD-10-CM

## 2020-01-21 DIAGNOSIS — I44.0 ATRIOVENTRICULAR BLOCK, FIRST DEGREE: ICD-10-CM

## 2020-01-21 DIAGNOSIS — M19.90 UNSPECIFIED OSTEOARTHRITIS, UNSPECIFIED SITE: ICD-10-CM
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
## 2023-06-15 NOTE — PHYSICAL THERAPY INITIAL EVALUATION ADULT - IMPAIRED TRANSFERS: SIT/STAND, REHAB EVAL
Prep Survey      Flowsheet Row Responses   [facility] facility patient discharged from? [place]   Is LACE score < 7 ? No   Eligibility Readm Mgmt   Discharge diagnosis abnormal CT of chest,  shingles   Does the patient have one of the following disease processes/diagnoses(primary or secondary)? Other   Does the patient have Home health ordered? Yes   What is the Home health agency?  [facility]   Is there a DME ordered? No   General alerts for this patient HX COPD   Prep survey completed? Yes            Leann WALSH - Registered Nurse           pain/impaired balance

## 2023-07-05 NOTE — PROGRESS NOTE ADULT - SUBJECTIVE AND OBJECTIVE BOX
Chief Complaint   Patient presents with    New Patient     New to provider establish care, 6 month f/up     1. Have you been to the ER, urgent care clinic since your last visit? Hospitalized since your last visit? No    2. Have you seen or consulted any other health care providers outside of the [address] [place] [address] Avenue since your last visit? Include any pap smears or colon screening.  No HPI:  Pt currently feels well. Has no acute complaints. On ROS reports some pain at hip from fall. Denies fevers, chills, SOB, CP. 12 pt ROS is otherwise negative.    Allergies    No Known Allergies    Intolerances    MEDICATIONS  (STANDING):  amLODIPine   Tablet 10 milliGRAM(s) Oral daily  aspirin enteric coated 81 milliGRAM(s) Oral every 24 hours  enoxaparin Injectable 40 milliGRAM(s) SubCutaneous every 24 hours  influenza  Vaccine (HIGH DOSE) 0.5 milliLiter(s) IntraMuscular once  polyethylene glycol 3350 17 Gram(s) Oral every 24 hours  senna 2 Tablet(s) Oral at bedtime  tamsulosin 0.4 milliGRAM(s) Oral at bedtime    MEDICATIONS  (PRN):  acetaminophen   Tablet .. 650 milliGRAM(s) Oral every 6 hours PRN Mild Pain (1 - 3), Moderate Pain (4 - 6)      Drug Dosing Weight  Height (cm): 165.1 (27 Dec 2019 22:39)  Weight (kg): 69.5 (27 Dec 2019 21:58)  BMI (kg/m2): 25.5 (27 Dec 2019 22:39)  BSA (m2): 1.77 (27 Dec 2019 22:39)    PAST MEDICAL & SURGICAL HISTORY:  History of BPH  Hypertension, unspecified type  H/O right knee surgery: 15 yrs ago      FAMILY HISTORY:  No pertinent family history in first degree relatives      Vital Signs Last 24 Hrs  T(C): 36.5 (28 Dec 2019 17:21), Max: 36.8 (28 Dec 2019 05:03)  T(F): 97.7 (28 Dec 2019 17:21), Max: 98.3 (28 Dec 2019 09:04)  HR: 74 (28 Dec 2019 17:21) (68 - 75)  BP: 128/69 (28 Dec 2019 17:21) (128/69 - 174/62)  BP(mean): --  ABP: --  ABP(mean): --  RR: 18 (28 Dec 2019 17:21) (16 - 18)  SpO2: 96% (28 Dec 2019 17:21) (96% - 98%)          I&O's Detail    27 Dec 2019 07:01  -  28 Dec 2019 07:00  --------------------------------------------------------  IN:  Total IN: 0 mL    OUT:    Voided: 775 mL  Total OUT: 775 mL    Total NET: -775 mL      28 Dec 2019 07:01  -  28 Dec 2019 19:17  --------------------------------------------------------  IN:  Total IN: 0 mL    OUT:    Voided: 450 mL  Total OUT: 450 mL    Total NET: -450 mL          PHYSICAL EXAM:    Constitutional: NAD  Eyes: PERRLA  ENMT: MMM  Neck: supple  Back: midline  Respiratory: CTA b/l  Cardiovascular: rrr, s1s2, no m/r/g  Gastrointestinal: soft, distended, non tender, + BS  Genitourinary: condom catheter in place  Extremities: warm  Vascular: b/l LE edema, L > R, + 2 in L, radial pulses b/l intact  Neurological: AAO x 3, CN 2 - 12 grossly intact  Skin: no rash  Lymph Nodes: no LAD  Musculoskeletal: no joint swelling, full ROM of hip and knee b/l  Psychiatric: normal affect        LABS:  CBC Full  -  ( 28 Dec 2019 08:07 )  WBC Count : 5.34 K/uL  RBC Count : 4.70 M/uL  Hemoglobin : 13.8 g/dL  Hematocrit : 42.3 %  Platelet Count - Automated : 219 K/uL  Mean Cell Volume : 90.0 fl  Mean Cell Hemoglobin : 29.4 pg  Mean Cell Hemoglobin Concentration : 32.6 gm/dL  Auto Neutrophil # : 3.23 K/uL  Auto Lymphocyte # : 1.41 K/uL  Auto Monocyte # : 0.53 K/uL  Auto Eosinophil # : 0.12 K/uL  Auto Basophil # : 0.02 K/uL  Auto Neutrophil % : 60.5 %  Auto Lymphocyte % : 26.4 %  Auto Monocyte % : 9.9 %  Auto Eosinophil % : 2.2 %  Auto Basophil % : 0.4 %    12-28    143  |  103  |  15  ----------------------------<  102<H>  4.0   |  26  |  1.19    Ca    8.4      28 Dec 2019 08:07  Mg     1.9     12-28    TPro  5.6<L>  /  Alb  3.3  /  TBili  0.9  /  DBili  x   /  AST  22  /  ALT  21  /  AlkPhos  75  12-28    CAPILLARY BLOOD GLUCOSE          Urinalysis Basic - ( 27 Dec 2019 13:01 )    Color: Yellow / Appearance: Clear / SG: <=1.005 / pH: x  Gluc: x / Ketone: NEGATIVE  / Bili: Negative / Urobili: 1.0 E.U./dL   Blood: x / Protein: NEGATIVE mg/dL / Nitrite: NEGATIVE   Leuk Esterase: NEGATIVE / RBC: x / WBC x   Sq Epi: x / Non Sq Epi: x / Bacteria: x        EKG:    ECHO, US:    RADIOLOGY:

## 2023-07-16 NOTE — PROGRESS NOTE ADULT - MINUTES
30
35
no discharge, no irritation, no pain, no redness, and no visual changes.
36
36
30
35
36
35

## 2025-03-10 NOTE — ED PROVIDER NOTE - NS_EDPROVIDERDISPOUSERTYPE_ED_A_ED
Physical Therapy  DATE: 3/10/2025    NAME: Asim Back  MRN: [id number]   : 1970    Patient not seen this date for Physical Therapy due to:      [] Cancel by RN or physician due to:    [] Hemodialysis    [] Critical Lab Value Level     [] Blood transfusion in progress    [] Acute or unstable cardiovascular status   _MAP < 55 or more than >115  _HR < 40 or > 130    [] Acute or unstable pulmonary status   -FiO2 > 60%   _RR < 5 or >40    _O2 sats < 85%    [] Strict Bedrest    [] Off Unit for surgery or procedure    [] Off Unit for testing       [] Pending imaging to R/O fracture    [x] Refusal by Patient; checked on pt @ 1500. Pt lying in bed stating his foot is causing him too much pain. When writer asked pt to rate pain, pt stated \"its in the 20s\". Will continue to follow.      [] Other      [] PT being discontinued at this time. Patient independent. No further needs.     [] PT being discontinued at this time as the patient has been transferred to hospice care. No further needs.      Electronically signed by Manasa Meyers PTA on 3/10/25 at 3:28 PM EDT      Attending Attestation (For Attendings USE Only)...